# Patient Record
Sex: MALE | Race: BLACK OR AFRICAN AMERICAN | NOT HISPANIC OR LATINO | URBAN - METROPOLITAN AREA
[De-identification: names, ages, dates, MRNs, and addresses within clinical notes are randomized per-mention and may not be internally consistent; named-entity substitution may affect disease eponyms.]

---

## 2022-04-29 ENCOUNTER — INPATIENT (INPATIENT)
Facility: HOSPITAL | Age: 52
LOS: 1 days | Discharge: HOME | End: 2022-05-01
Attending: STUDENT IN AN ORGANIZED HEALTH CARE EDUCATION/TRAINING PROGRAM | Admitting: STUDENT IN AN ORGANIZED HEALTH CARE EDUCATION/TRAINING PROGRAM
Payer: SELF-PAY

## 2022-04-29 VITALS
HEART RATE: 18 BPM | HEIGHT: 68 IN | RESPIRATION RATE: 18 BRPM | TEMPERATURE: 98 F | SYSTOLIC BLOOD PRESSURE: 147 MMHG | OXYGEN SATURATION: 97 % | WEIGHT: 270.07 LBS | DIASTOLIC BLOOD PRESSURE: 95 MMHG

## 2022-04-29 DIAGNOSIS — N13.2 HYDRONEPHROSIS WITH RENAL AND URETERAL CALCULOUS OBSTRUCTION: ICD-10-CM

## 2022-04-29 DIAGNOSIS — E66.01 MORBID (SEVERE) OBESITY DUE TO EXCESS CALORIES: ICD-10-CM

## 2022-04-29 DIAGNOSIS — N28.89 OTHER SPECIFIED DISORDERS OF KIDNEY AND URETER: ICD-10-CM

## 2022-04-29 DIAGNOSIS — N17.9 ACUTE KIDNEY FAILURE, UNSPECIFIED: ICD-10-CM

## 2022-04-29 DIAGNOSIS — I10 ESSENTIAL (PRIMARY) HYPERTENSION: ICD-10-CM

## 2022-04-29 LAB
ALBUMIN SERPL ELPH-MCNC: 3.4 G/DL — SIGNIFICANT CHANGE UP (ref 3.3–5)
ALP SERPL-CCNC: 93 U/L — SIGNIFICANT CHANGE UP (ref 40–120)
ALT FLD-CCNC: 23 U/L — SIGNIFICANT CHANGE UP (ref 12–78)
ANION GAP SERPL CALC-SCNC: 4 MMOL/L — LOW (ref 5–17)
APPEARANCE UR: CLEAR — SIGNIFICANT CHANGE UP
AST SERPL-CCNC: 13 U/L — LOW (ref 15–37)
BACTERIA # UR AUTO: ABNORMAL
BASOPHILS # BLD AUTO: 0.02 K/UL — SIGNIFICANT CHANGE UP (ref 0–0.2)
BASOPHILS NFR BLD AUTO: 0.2 % — SIGNIFICANT CHANGE UP (ref 0–2)
BILIRUB SERPL-MCNC: 1.3 MG/DL — HIGH (ref 0.2–1.2)
BILIRUB UR-MCNC: NEGATIVE — SIGNIFICANT CHANGE UP
BUN SERPL-MCNC: 18 MG/DL — SIGNIFICANT CHANGE UP (ref 7–23)
CALCIUM SERPL-MCNC: 9.2 MG/DL — SIGNIFICANT CHANGE UP (ref 8.5–10.1)
CHLORIDE SERPL-SCNC: 101 MMOL/L — SIGNIFICANT CHANGE UP (ref 96–108)
CO2 SERPL-SCNC: 32 MMOL/L — HIGH (ref 22–31)
COLOR SPEC: YELLOW — SIGNIFICANT CHANGE UP
CREAT SERPL-MCNC: 1.72 MG/DL — HIGH (ref 0.5–1.3)
DIFF PNL FLD: ABNORMAL
EGFR: 47 ML/MIN/1.73M2 — LOW
EOSINOPHIL # BLD AUTO: 0.14 K/UL — SIGNIFICANT CHANGE UP (ref 0–0.5)
EOSINOPHIL NFR BLD AUTO: 1.3 % — SIGNIFICANT CHANGE UP (ref 0–6)
FLUAV AG NPH QL: SIGNIFICANT CHANGE UP
FLUBV AG NPH QL: SIGNIFICANT CHANGE UP
GLUCOSE SERPL-MCNC: 93 MG/DL — SIGNIFICANT CHANGE UP (ref 70–99)
GLUCOSE UR QL: NEGATIVE MG/DL — SIGNIFICANT CHANGE UP
HCT VFR BLD CALC: 38.7 % — LOW (ref 39–50)
HGB BLD-MCNC: 12.9 G/DL — LOW (ref 13–17)
IMM GRANULOCYTES NFR BLD AUTO: 0.5 % — SIGNIFICANT CHANGE UP (ref 0–1.5)
KETONES UR-MCNC: NEGATIVE — SIGNIFICANT CHANGE UP
LEUKOCYTE ESTERASE UR-ACNC: NEGATIVE — SIGNIFICANT CHANGE UP
LYMPHOCYTES # BLD AUTO: 1.3 K/UL — SIGNIFICANT CHANGE UP (ref 1–3.3)
LYMPHOCYTES # BLD AUTO: 12.5 % — LOW (ref 13–44)
MCHC RBC-ENTMCNC: 25.5 PG — LOW (ref 27–34)
MCHC RBC-ENTMCNC: 33.3 G/DL — SIGNIFICANT CHANGE UP (ref 32–36)
MCV RBC AUTO: 76.6 FL — LOW (ref 80–100)
MONOCYTES # BLD AUTO: 1.16 K/UL — HIGH (ref 0–0.9)
MONOCYTES NFR BLD AUTO: 11.2 % — SIGNIFICANT CHANGE UP (ref 2–14)
NEUTROPHILS # BLD AUTO: 7.71 K/UL — HIGH (ref 1.8–7.4)
NEUTROPHILS NFR BLD AUTO: 74.3 % — SIGNIFICANT CHANGE UP (ref 43–77)
NITRITE UR-MCNC: NEGATIVE — SIGNIFICANT CHANGE UP
NRBC # BLD: 0 /100 WBCS — SIGNIFICANT CHANGE UP (ref 0–0)
PH UR: 7 — SIGNIFICANT CHANGE UP (ref 5–8)
PLATELET # BLD AUTO: 228 K/UL — SIGNIFICANT CHANGE UP (ref 150–400)
POTASSIUM SERPL-MCNC: 3.7 MMOL/L — SIGNIFICANT CHANGE UP (ref 3.5–5.3)
POTASSIUM SERPL-SCNC: 3.7 MMOL/L — SIGNIFICANT CHANGE UP (ref 3.5–5.3)
PROT SERPL-MCNC: 7.9 GM/DL — SIGNIFICANT CHANGE UP (ref 6–8.3)
PROT UR-MCNC: 30 MG/DL
RBC # BLD: 5.05 M/UL — SIGNIFICANT CHANGE UP (ref 4.2–5.8)
RBC # FLD: 14.6 % — HIGH (ref 10.3–14.5)
RBC CASTS # UR COMP ASSIST: ABNORMAL /HPF (ref 0–4)
SARS-COV-2 RNA SPEC QL NAA+PROBE: SIGNIFICANT CHANGE UP
SODIUM SERPL-SCNC: 137 MMOL/L — SIGNIFICANT CHANGE UP (ref 135–145)
SP GR SPEC: 1.01 — SIGNIFICANT CHANGE UP (ref 1.01–1.02)
UROBILINOGEN FLD QL: NEGATIVE MG/DL — SIGNIFICANT CHANGE UP
WBC # BLD: 10.38 K/UL — SIGNIFICANT CHANGE UP (ref 3.8–10.5)
WBC # FLD AUTO: 10.38 K/UL — SIGNIFICANT CHANGE UP (ref 3.8–10.5)
WBC UR QL: SIGNIFICANT CHANGE UP

## 2022-04-29 PROCEDURE — 99285 EMERGENCY DEPT VISIT HI MDM: CPT

## 2022-04-29 PROCEDURE — 93010 ELECTROCARDIOGRAM REPORT: CPT

## 2022-04-29 PROCEDURE — 74177 CT ABD & PELVIS W/CONTRAST: CPT | Mod: 26,MA

## 2022-04-29 PROCEDURE — 71045 X-RAY EXAM CHEST 1 VIEW: CPT | Mod: 26

## 2022-04-29 PROCEDURE — 99232 SBSQ HOSP IP/OBS MODERATE 35: CPT

## 2022-04-29 RX ORDER — LABETALOL HCL 100 MG
300 TABLET ORAL
Refills: 0 | Status: DISCONTINUED | OUTPATIENT
Start: 2022-04-29 | End: 2022-04-30

## 2022-04-29 RX ORDER — SODIUM CHLORIDE 9 MG/ML
1000 INJECTION INTRAMUSCULAR; INTRAVENOUS; SUBCUTANEOUS ONCE
Refills: 0 | Status: COMPLETED | OUTPATIENT
Start: 2022-04-29 | End: 2022-04-29

## 2022-04-29 RX ORDER — SODIUM CHLORIDE 9 MG/ML
1000 INJECTION INTRAMUSCULAR; INTRAVENOUS; SUBCUTANEOUS
Refills: 0 | Status: DISCONTINUED | OUTPATIENT
Start: 2022-04-29 | End: 2022-04-30

## 2022-04-29 RX ORDER — LOSARTAN POTASSIUM 100 MG/1
100 TABLET, FILM COATED ORAL DAILY
Refills: 0 | Status: DISCONTINUED | OUTPATIENT
Start: 2022-04-29 | End: 2022-04-30

## 2022-04-29 RX ORDER — SODIUM CHLORIDE 9 MG/ML
1000 INJECTION, SOLUTION INTRAVENOUS
Refills: 0 | Status: DISCONTINUED | OUTPATIENT
Start: 2022-04-29 | End: 2022-04-29

## 2022-04-29 RX ORDER — CEFTRIAXONE 500 MG/1
1000 INJECTION, POWDER, FOR SOLUTION INTRAMUSCULAR; INTRAVENOUS ONCE
Refills: 0 | Status: COMPLETED | OUTPATIENT
Start: 2022-04-29 | End: 2022-04-29

## 2022-04-29 RX ORDER — CEFTRIAXONE 500 MG/1
INJECTION, POWDER, FOR SOLUTION INTRAMUSCULAR; INTRAVENOUS
Refills: 0 | Status: DISCONTINUED | OUTPATIENT
Start: 2022-04-29 | End: 2022-04-30

## 2022-04-29 RX ORDER — MORPHINE SULFATE 50 MG/1
4 CAPSULE, EXTENDED RELEASE ORAL ONCE
Refills: 0 | Status: DISCONTINUED | OUTPATIENT
Start: 2022-04-29 | End: 2022-04-29

## 2022-04-29 RX ORDER — CEFTRIAXONE 500 MG/1
1000 INJECTION, POWDER, FOR SOLUTION INTRAMUSCULAR; INTRAVENOUS EVERY 24 HOURS
Refills: 0 | Status: DISCONTINUED | OUTPATIENT
Start: 2022-04-30 | End: 2022-04-30

## 2022-04-29 RX ORDER — HYDROMORPHONE HYDROCHLORIDE 2 MG/ML
1 INJECTION INTRAMUSCULAR; INTRAVENOUS; SUBCUTANEOUS EVERY 4 HOURS
Refills: 0 | Status: DISCONTINUED | OUTPATIENT
Start: 2022-04-29 | End: 2022-04-30

## 2022-04-29 RX ORDER — AMLODIPINE BESYLATE 2.5 MG/1
10 TABLET ORAL DAILY
Refills: 0 | Status: DISCONTINUED | OUTPATIENT
Start: 2022-04-29 | End: 2022-04-30

## 2022-04-29 RX ORDER — KETOROLAC TROMETHAMINE 30 MG/ML
15 SYRINGE (ML) INJECTION ONCE
Refills: 0 | Status: DISCONTINUED | OUTPATIENT
Start: 2022-04-29 | End: 2022-04-29

## 2022-04-29 RX ORDER — FLUTICASONE PROPIONATE 50 MCG
1 SPRAY, SUSPENSION NASAL
Refills: 0 | Status: DISCONTINUED | OUTPATIENT
Start: 2022-04-29 | End: 2022-04-30

## 2022-04-29 RX ORDER — LANOLIN ALCOHOL/MO/W.PET/CERES
3 CREAM (GRAM) TOPICAL AT BEDTIME
Refills: 0 | Status: DISCONTINUED | OUTPATIENT
Start: 2022-04-29 | End: 2022-04-30

## 2022-04-29 RX ADMIN — SODIUM CHLORIDE 1000 MILLILITER(S): 9 INJECTION INTRAMUSCULAR; INTRAVENOUS; SUBCUTANEOUS at 14:25

## 2022-04-29 RX ADMIN — Medication 1 SPRAY(S): at 21:19

## 2022-04-29 RX ADMIN — Medication 15 MILLIGRAM(S): at 18:30

## 2022-04-29 RX ADMIN — Medication 3 MILLIGRAM(S): at 21:19

## 2022-04-29 RX ADMIN — LOSARTAN POTASSIUM 100 MILLIGRAM(S): 100 TABLET, FILM COATED ORAL at 21:18

## 2022-04-29 RX ADMIN — CEFTRIAXONE 100 MILLIGRAM(S): 500 INJECTION, POWDER, FOR SOLUTION INTRAMUSCULAR; INTRAVENOUS at 20:45

## 2022-04-29 RX ADMIN — AMLODIPINE BESYLATE 10 MILLIGRAM(S): 2.5 TABLET ORAL at 21:18

## 2022-04-29 RX ADMIN — SODIUM CHLORIDE 80 MILLILITER(S): 9 INJECTION INTRAMUSCULAR; INTRAVENOUS; SUBCUTANEOUS at 19:38

## 2022-04-29 RX ADMIN — HYDROMORPHONE HYDROCHLORIDE 1 MILLIGRAM(S): 2 INJECTION INTRAMUSCULAR; INTRAVENOUS; SUBCUTANEOUS at 22:48

## 2022-04-29 RX ADMIN — Medication 300 MILLIGRAM(S): at 21:19

## 2022-04-29 RX ADMIN — Medication 15 MILLIGRAM(S): at 18:05

## 2022-04-29 RX ADMIN — HYDROMORPHONE HYDROCHLORIDE 1 MILLIGRAM(S): 2 INJECTION INTRAMUSCULAR; INTRAVENOUS; SUBCUTANEOUS at 23:57

## 2022-04-29 NOTE — ED ADULT NURSE REASSESSMENT NOTE - NS ED NURSE REASSESS COMMENT FT1
Report given to nurse Verónica. Patient pending portable x-ray, will be done at bedside as per radiology.

## 2022-04-29 NOTE — ED ADULT TRIAGE NOTE - CHIEF COMPLAINT QUOTE
c/o r flank pain since tuesday using tylenol and ibuprofen prn with some improvement but pain worse since yesterday c/o chills since last night denies difficulty urinating or dysuria denies tan hematuria

## 2022-04-29 NOTE — H&P ADULT - NSHPLABSRESULTS_GEN_ALL_CORE
LABS:                        12.9   10.38 )-----------( 228      ( 2022 14:40 )             38.7         137  |  101  |  18  ----------------------------<  93  3.7   |  32<H>  |  1.72<H>    Ca    9.2      2022 14:40    TPro  7.9  /  Alb  3.4  /  TBili  1.3<H>  /  DBili  x   /  AST  13<L>  /  ALT  23  /  AlkPhos  93            Urinalysis Basic - ( 2022 16:02 )    Color: Yellow / Appearance: Clear / S.010 / pH: x  Gluc: x / Ketone: Negative  / Bili: Negative / Urobili: Negative mg/dL   Blood: x / Protein: 30 mg/dL / Nitrite: Negative   Leuk Esterase: Negative / RBC: 11-25 /HPF / WBC 0-2   Sq Epi: x / Non Sq Epi: x / Bacteria: Moderate

## 2022-04-29 NOTE — ED ADULT NURSE NOTE - HOW OFTEN DO YOU HAVE SIX OR MORE DRINKS ON ONE OCCASION?
Never Acitretin Counseling:  I discussed with the patient the risks of acitretin including but not limited to hair loss, dry lips/skin/eyes, liver damage, hyperlipidemia, depression/suicidal ideation, photosensitivity.  Serious rare side effects can include but are not limited to pancreatitis, pseudotumor cerebri, bony changes, clot formation/stroke/heart attack.  Patient understands that alcohol is contraindicated since it can result in liver toxicity and significantly prolong the elimination of the drug by many years.

## 2022-04-29 NOTE — ED PROVIDER NOTE - OBJECTIVE STATEMENT
51 years old male walked in c/o two days of abd pain and right flank pain without n/v/d. Pt last bowel movement this morning, Pt just had Pfizer boot shot this wk. Pt sts the pain varies with body movement. Pt sts he has no pain now because he took tylenol and motrin at 10:00 am this morning. Pt denies headache, dizziness, blurred visions, light sensitivities, focal/distal weakness or numbness, cough, sob, chest pain, nausea vomiting, fever, chills, abd pain, dysuria, hematuria or irregular bowel movements

## 2022-04-29 NOTE — CONSULT NOTE ADULT - ASSESSMENT
Impression: Urology consulted for proximal right ureteral stone approx 1cm, right renal mass approx    Recommendations:    NPO after midnight  IVF at midnight  Plan for OR tomorrow afternoon for cystoscopy, right ureteral stent insertion.

## 2022-04-29 NOTE — ED PROVIDER NOTE - CONSTITUTIONAL, MLM
normal... Well appearing, awake, alert, oriented to person, place, time/situation and in no apparent distress. Speaking in clear full sentences no nasal flaring no shoulders retractions no diaphoresis appears very comfortable

## 2022-04-29 NOTE — ED ADULT NURSE NOTE - OBJECTIVE STATEMENT
Patient alert and oriented X 3, c/o right flank pain X 4 days, state pain has worsened. States he took Tylenol and Motrin before coming to ED and pain subsided.  denies chills, fevers, N/V/D. denies dysuria or changes in color or odor of urine.

## 2022-04-29 NOTE — H&P ADULT - NSHPPHYSICALEXAM_GEN_ALL_CORE
PHYSICAL EXAMINATION:  Vital Signs Last 24 Hrs  T(C): 36.6 (29 Apr 2022 13:05), Max: 36.6 (29 Apr 2022 13:05)  T(F): 97.8 (29 Apr 2022 13:05), Max: 97.8 (29 Apr 2022 13:05)  HR: 18 (29 Apr 2022 13:05) (18 - 18)  BP: 147/95 (29 Apr 2022 13:05) (147/95 - 147/95)  BP(mean): --  RR: 18 (29 Apr 2022 13:05) (18 - 18)  SpO2: 97% (29 Apr 2022 13:05) (97% - 97%)  CAPILLARY BLOOD GLUCOSE            GENERAL: NAD, well-groomed,  HEAD:  atraumatic, normocephalic  EYES: sclera anicteric  ENMT: mucous membranes moist  NECK: supple, No JVD  CHEST/LUNG: clear to auscultation bilaterally;    no      rales   ,   no rhonchi,   HEART: normal S1, S2  ABDOMEN: BS+, soft, ND, NT   no flank tenderness  now  EXTREMITIES:    no    edema    b/l LEs  NEURO: awake, ,     moves all extremities  SKIN: no     rash

## 2022-04-29 NOTE — H&P ADULT - ASSESSMENT
51 years old male    h/p  HTN       c/o two days of abd pain and right flank pain   had Pfizer boot shot this wk.   he took tylenol and motrin at 10:00 am this morning., and pain was  bit better    denies headache, dizziness, cough, sob, chest pain, nausea vomiting, fever, chills, abd pain, dysuria       *  admitted with ab d/  right flank pain for   4  to 5  days   pt  with right renal   stone, 1  cm/  renal mass  CT a/p, 1cn prox Right ureteral calculus,  12 cm   by 12  cm  right renal mass  on iv fluids   iv dilaudid  strain urine  pt is afebrile now  *  probable RCC  await urology  eval  Urology called by er  *  HTN   on  hyzaar, norvasc.  labetolol  *  on dvt ppx   check cxr/ ekg     RAD< from: CT Abdomen and Pelvis w/ IV Cont (04.29.22 @ 15:42) >  IMPRESSION:  1 cm right proximal ureteral calculus with mild hydronephrosis and   obstructive uropathy.  12.8 x 11.9 x 12.1 cm right lower pole renal mass suspicious for   neoplasm. Complex cyst and abscess are not excluded however neoplasm is   favored. Recommend follow-up CT or MRI with renal mass protocol and/or   ultrasound for additional characterization. Limited evaluation of the   right renal vein and IVC secondary to phase of injection.  Findings were discussed with Dr. STEFANY ZULUAGA [id number] 4/29/2022 4:03 PM by   Dr. Ro Akhtar with read back confirmation.  < end of copied text >   51 years old male    h/p  HTN       c/o two days of abd pain and right flank pain   had Pfizer boot shot this wk.   he took tylenol and motrin at 10:00 am this morning., and pain was  bit better    denies headache, dizziness, cough, sob, chest pain, nausea vomiting, fever, chills, abd pain, dysuria       *  admitted with abd/ right flank pain for   4  to 5  days   pt  with right renal   stone, 1  cm/  renal mass  CT a/p, 1cn prox Right ureteral calculus,  12 cm   by 12  cm  right renal mass  on iv fluids   iv Dilaudid  strain urine  pt is afebrile now  *  probable RCC  await urology  eval  Urology called by er  *  ADELINA   on iv fluids  *  HTN   on  hyzaar, norvasc.  labetolol  *  on dvt ppx   check cxr/ ekg     RAD< from: CT Abdomen and Pelvis w/ IV Cont (04.29.22 @ 15:42) >  IMPRESSION:  1 cm right proximal ureteral calculus with mild hydronephrosis and   obstructive uropathy.  12.8 x 11.9 x 12.1 cm right lower pole renal mass suspicious for   neoplasm. Complex cyst and abscess are not excluded however neoplasm is   favored. Recommend follow-up CT or MRI with renal mass protocol and/or   ultrasound for additional characterization. Limited evaluation of the   right renal vein and IVC secondary to phase of injection.  Findings were discussed with Dr. STEFANY ZULUAAG [id number] 4/29/2022 4:03 PM by   Dr. Ro Akhtar with read back confirmation.  < end of copied text >   51 years old male    h/p  HTN       c/o two days of abd pain and right flank pain   had Pfizer boot shot this wk.   he took tylenol and motrin at 10:00 am this morning., and pain was  bit better    denies headache, dizziness, cough, sob, chest pain, nausea vomiting, fever, chills, abd pain, dysuria       *  admitted with abd/ right flank pain for   4  to 5  days   pt  with right renal   stone, 1  cm/  renal mass  CT a/p, 1cn prox Right ureteral calculus,  12 cm   by 12  cm  right renal mass  on iv fluids   iv Dilaudid  strain urine  pt is afebrile now  *  probable RCC  await urology  eval  Urology called by er  *  ADELINA   on iv fluids  *  HTN   on  hyzaar, norvasc.  labetolol  *  on dvt ppx / PAS/  awaiting  OR in am  check cxr/ ekg     RAD< from: CT Abdomen and Pelvis w/ IV Cont (04.29.22 @ 15:42) >  IMPRESSION:  1 cm right proximal ureteral calculus with mild hydronephrosis and   obstructive uropathy.  12.8 x 11.9 x 12.1 cm right lower pole renal mass suspicious for   neoplasm. Complex cyst and abscess are not excluded however neoplasm is   favored. Recommend follow-up CT or MRI with renal mass protocol and/or   ultrasound for additional characterization. Limited evaluation of the   right renal vein and IVC secondary to phase of injection.  Findings were discussed with Dr. STEFANY ZULUAGA [id number] 4/29/2022 4:03 PM by   Dr. Ro Akhtar with read back confirmation.  < end of copied text >   51 years old male    h/p  HTN       c/o two days of abd pain and right flank pain   had Pfizer boot shot this wk.   he took tylenol and motrin at 10:00 am this morning., and pain was  bit better    denies headache, dizziness, cough, sob, chest pain, nausea vomiting, fever, chills, abd pain, dysuria       *  admitted with abd/ right flank pain for   4  to 5  days   pt  with right renal   stone, 1  cm/  renal mass  CT a/p, 1cn prox Right ureteral calculus,  12 cm   by 12  cm  right renal mass  on iv fluids   iv Dilaudid  strain urine  pt is afebrile now  *  probable RCC  await urology  eval  Urology called by er  *  ADELINA   on iv fluids.  and hctz   was held  *  HTN   on  hyzaar, norvasc.  labetolol  *  on dvt ppx / PAS/  awaiting  OR in am  check cxr/ ekg     RAD< from: CT Abdomen and Pelvis w/ IV Cont (04.29.22 @ 15:42) >  IMPRESSION:  1 cm right proximal ureteral calculus with mild hydronephrosis and   obstructive uropathy.  12.8 x 11.9 x 12.1 cm right lower pole renal mass suspicious for   neoplasm. Complex cyst and abscess are not excluded however neoplasm is   favored. Recommend follow-up CT or MRI with renal mass protocol and/or   ultrasound for additional characterization. Limited evaluation of the   right renal vein and IVC secondary to phase of injection.  Findings were discussed with Dr. STEFANY ZULUAGA [phone] 4/29/2022 4:03 PM by   Dr. Ro Akhtar with read back confirmation.  < end of copied text >

## 2022-04-29 NOTE — PATIENT PROFILE ADULT - FALL HARM RISK - RISK INTERVENTIONS
Bed in lowest position, wheels locked, appropriate side rails in place/Call bell, personal items and telephone in reach/[place] to call system/Purposeful Proactive Rounding/Room/bathroom lighting operational, light cord in reach

## 2022-04-29 NOTE — H&P ADULT - HISTORY OF PRESENT ILLNESS
51 years old male    h/p  HTN       c/o two days of abd pain and right flank pain   denies   n/v/d.   last bowel movement this morning,   had Pfizer boot shot this wk.   he took tylenol and motrin at 10:00 am this morning.        denies headache, dizziness, cough, sob, chest pain, nausea vomiting, fever, chills, abd pain, dysuria  urology  PA called by er

## 2022-04-29 NOTE — CONSULT NOTE ADULT - SUBJECTIVE AND OBJECTIVE BOX
UROLOGY CONSULT NOTE    Patient is a 51y old  Male who presents with a chief complaint of abd pain (2022 18:00)      HPI:  "51 years old male    h/p  HTN       c/o two days of abd pain and right flank pain   denies   n/v/d.   last bowel movement this morning,   had Pfizer boot shot this wk.   he took tylenol and motrin at 10:00 am this morning.        denies headache, dizziness, cough, sob, chest pain, nausea vomiting, fever, chills, abd pain, dysuria  urology  PA called by er "    Urology consulted for proximal right ureteral stone approx 1cm. Cr bumped to 1.7. Patient states right back/flank pain started this AM, attempted to take tylenol and motrin with no relief of pain. Last ate approx 1pm. States that pain is well controlled. Denies nausea and vomiting. Denies hx of kidney stones. Denies gross hematuria. Discussed right renal mass, patient unaware it is there, will need followup imaging.       PAST MEDICAL & SURGICAL HISTORY:  No pertinent past medical history  Hypertension    REVIEW OF SYSTEMS:  Constitutional: Denies fever, weight loss, fatigue  Eye: Denies eye pain, visual changes, discharge, blurred vision  ENT: Denies hearing changes, tinnitus, vertigo, sinus congestion, sore throat  Neck: Denies pain or stiffness  Respiratory: Denies cough, wheezing, chills, hemoptysis, shortness of breath, difficulty breathing  Cardiovascular: Denies chest pain, palpitations, dizziness, leg swelling  Gastrointestinal: Denies abdominal pain, nausea, vomiting, hematemesis, diarrhea, constipation, melena, hematochezia  Genitourinary: Denies dysuria, frequency, hematuria, retention, incontinence, Endorses CVA tenderness  Neurological: Denies headaches, memory loss, loss of strength, numbness, tremors  Skin: Denies itching, burning, rashes, lesions   Endocrine: Denies heat or cold intolerance, hair loss  Musculoskeletal: Denies joint pain or swelling, back, extremity pain  Psychiatric: Denies depression, anxiety, mood swings, difficulty sleeping, suicidal ideation  Hematology: Denies easy bruising, bleeding gums  Immunologic: Denies hives or eczema    MEDICATIONS  (STANDING):  amLODIPine   Tablet 10 milliGRAM(s) Oral daily  labetalol 300 milliGRAM(s) Oral two times a day  losartan 100 milliGRAM(s) Oral daily  melatonin 3 milliGRAM(s) Oral at bedtime  morphine  - Injectable 4 milliGRAM(s) IV Push once  sodium chloride 0.9%. 1000 milliLiter(s) (80 mL/Hr) IV Continuous <Continuous>    MEDICATIONS  (PRN):  HYDROmorphone  Injectable 1 milliGRAM(s) IV Push every 4 hours PRN Moderate Pain (4 - 6)      Allergies  No Known Allergies          SOCIAL HISTORY          Smoking: Yes [ ]  No [x ]   ______pk yrs          ETOH  Yes [ ]  No [x ]  Social [ ]          DRUGS:  Yes [ ]  No [x ]  if so what______________    FAMILY HISTORY:      Vital Signs Last 24 Hrs  T(C): 37.9 (2022 18:53), Max: 37.9 (2022 18:53)  T(F): 100.3 (2022 18:53), Max: 100.3 (2022 18:53)  HR: 96 (2022 18:53) (18 - 96)  BP: 144/95 (2022 18:53) (144/95 - 147/95)  BP(mean): --  RR: 18 (2022 18:53) (18 - 18)  SpO2: 95% (2022 18:53) (95% - 97%)    Physical Exam:    GENERAL: NAD, well-groomed, thin  HEAD:  Atraumatic, Normocephalic  EYES: EOMI, PERRLA, conjunctiva and sclera clear  NECK: Supple, No JVD, Normal thyroid  NERVOUS SYSTEM:  Alert & Oriented X3, Good concentration  CHEST/LUNG: Clear to percussion bilaterally  HEART: Regular rate and rhythm  ABDOMEN: Soft, mildly diffuse tenderness, Nondistended  EXTREMITIES:  2+ Peripheral Pulses  LYMPH: No cervical adenopathy  : No suprapubic tenderness, no bladder distention, no gross hematuria.  SKIN: No rashes or lesions      LABS:                        12.9   10.38 )-----------( 228      ( 2022 14:40 )             38.7         137  |  101  |  18  ----------------------------<  93  3.7   |  32<H>  |  1.72<H>    Ca    9.2      2022 14:40    TPro  7.9  /  Alb  3.4  /  TBili  1.3<H>  /  DBili  x   /  AST  13<L>  /  ALT  23  /  AlkPhos  93        Urinalysis Basic - ( 2022 16:02 )    Color: Yellow / Appearance: Clear / S.010 / pH: x  Gluc: x / Ketone: Negative  / Bili: Negative / Urobili: Negative mg/dL   Blood: x / Protein: 30 mg/dL / Nitrite: Negative   Leuk Esterase: Negative / RBC: 11-25 /HPF / WBC 0-2   Sq Epi: x / Non Sq Epi: x / Bacteria: Moderate        RADIOLOGY & ADDITIONAL STUDIES:  ACC: [id number] EXAM:  CT ABDOMEN AND PELVIS IC                          PROCEDURE DATE:  2022          INTERPRETATION:  CLINICAL INFORMATION: 51 years  Male with right flank   pain x 2 days.    COMPARISON: None.    CONTRAST/COMPLICATIONS:  IV Contrast: Omnipaque 350  90 cc administered   10 cc discarded  Oral Contrast: NONE  Complications: None reported at time of study completion    PROCEDURE:  CT of the Abdomen and Pelvis was performed.  Sagittal and coronal reformats were performed.    FINDINGS:  LOWER CHEST: Mild right middle lobe discoid atelectasis. Mild   cardiomegaly.    LIVER: Within normal limits.  BILE DUCTS: Normal caliber.  GALLBLADDER: Within normal limits.  SPLEEN: Within normal limits.  PANCREAS: Within normal limits.  ADRENALS: Within normal limits.  KIDNEYS/URETERS: Mild right hydronephrosis to the level of a 1 cm   proximal ureteral calculus with delayed right nephrogram secondary to   obstruction. There is also a 12.8 x 11.9 x 12.1 cm heterogeneous mass   projecting off the right lower pole suspicious for neoplasm. The renal   vein and IVC cannot be assessed due to phase of injection.  No left hydronephrosis. Left renal hypodensities too small to   characterize. No left urolithiasis.    BLADDER: Within normal limits.  REPRODUCTIVE ORGANS: Normal size prostate with punctate right-sided   calcification.    BOWEL: No bowel obstruction. Appendix is not visualized. No evidence of   inflammation in the pericecal region.  PERITONEUM: No ascites.  VESSELS: Limited evaluation of the right renal vein and IVC secondary to   phase of injection. Mild atherosclerosis.  RETROPERITONEUM/LYMPH NODES: No lymphadenopathy.  ABDOMINAL WALL: Small fat-containing umbilical hernia.  BONES: Degenerative changes.    IMPRESSION:  1 cm right proximal ureteral calculus with mild hydronephrosis and   obstructive uropathy.    12.8 x 11.9 x 12.1 cm right lower pole renal mass suspicious for   neoplasm. Complex cyst and abscess are not excluded however neoplasm is   favored. Recommend follow-up CT or MRI with renal mass protocol and/or   ultrasound for additional characterization. Limited evaluation of the   right renal vein and IVC secondary to phase of injection.    Findings were discussed with Dr. STEFANY ZULUAGA [phone] 2022 4:03 PM by   Dr. Ro Akhtar with read back confirmation.    --- End of Report ---

## 2022-04-30 LAB
ANION GAP SERPL CALC-SCNC: 6 MMOL/L — SIGNIFICANT CHANGE UP (ref 5–17)
APTT BLD: 30.5 SEC — SIGNIFICANT CHANGE UP (ref 27.5–35.5)
BUN SERPL-MCNC: 19 MG/DL — SIGNIFICANT CHANGE UP (ref 7–23)
CALCIUM SERPL-MCNC: 9 MG/DL — SIGNIFICANT CHANGE UP (ref 8.5–10.1)
CHLORIDE SERPL-SCNC: 105 MMOL/L — SIGNIFICANT CHANGE UP (ref 96–108)
CO2 SERPL-SCNC: 28 MMOL/L — SIGNIFICANT CHANGE UP (ref 22–31)
CREAT SERPL-MCNC: 1.84 MG/DL — HIGH (ref 0.5–1.3)
EGFR: 43 ML/MIN/1.73M2 — LOW
GLUCOSE SERPL-MCNC: 119 MG/DL — HIGH (ref 70–99)
HCT VFR BLD CALC: 39.6 % — SIGNIFICANT CHANGE UP (ref 39–50)
HGB BLD-MCNC: 12.7 G/DL — LOW (ref 13–17)
INR BLD: 1.12 RATIO — SIGNIFICANT CHANGE UP (ref 0.88–1.16)
MCHC RBC-ENTMCNC: 24.9 PG — LOW (ref 27–34)
MCHC RBC-ENTMCNC: 32.1 G/DL — SIGNIFICANT CHANGE UP (ref 32–36)
MCV RBC AUTO: 77.5 FL — LOW (ref 80–100)
NRBC # BLD: 0 /100 WBCS — SIGNIFICANT CHANGE UP (ref 0–0)
PLATELET # BLD AUTO: 243 K/UL — SIGNIFICANT CHANGE UP (ref 150–400)
POTASSIUM SERPL-MCNC: 4.1 MMOL/L — SIGNIFICANT CHANGE UP (ref 3.5–5.3)
POTASSIUM SERPL-SCNC: 4.1 MMOL/L — SIGNIFICANT CHANGE UP (ref 3.5–5.3)
PROTHROM AB SERPL-ACNC: 13.4 SEC — SIGNIFICANT CHANGE UP (ref 10.5–13.4)
RBC # BLD: 5.11 M/UL — SIGNIFICANT CHANGE UP (ref 4.2–5.8)
RBC # FLD: 14.6 % — HIGH (ref 10.3–14.5)
SODIUM SERPL-SCNC: 139 MMOL/L — SIGNIFICANT CHANGE UP (ref 135–145)
WBC # BLD: 9.1 K/UL — SIGNIFICANT CHANGE UP (ref 3.8–10.5)
WBC # FLD AUTO: 9.1 K/UL — SIGNIFICANT CHANGE UP (ref 3.8–10.5)

## 2022-04-30 PROCEDURE — 88112 CYTOPATH CELL ENHANCE TECH: CPT | Mod: 26

## 2022-04-30 PROCEDURE — 99233 SBSQ HOSP IP/OBS HIGH 50: CPT

## 2022-04-30 DEVICE — GWIRE STR .035X150 STD: Type: IMPLANTABLE DEVICE | Status: FUNCTIONAL

## 2022-04-30 DEVICE — STENT URET PERCFLX PLUS 6X26 W/O GDWIRE: Type: IMPLANTABLE DEVICE | Status: FUNCTIONAL

## 2022-04-30 DEVICE — CATH URET POLLACK 5.5FR 70CM: Type: IMPLANTABLE DEVICE | Status: FUNCTIONAL

## 2022-04-30 DEVICE — GWIRE SENSOR DUAL-FLEX NITINOL0.038INX150CM: Type: IMPLANTABLE DEVICE | Status: FUNCTIONAL

## 2022-04-30 RX ORDER — SODIUM CHLORIDE 9 MG/ML
1000 INJECTION, SOLUTION INTRAVENOUS
Refills: 0 | Status: DISCONTINUED | OUTPATIENT
Start: 2022-04-30 | End: 2022-05-01

## 2022-04-30 RX ORDER — SODIUM CHLORIDE 9 MG/ML
1000 INJECTION, SOLUTION INTRAVENOUS
Refills: 0 | Status: DISCONTINUED | OUTPATIENT
Start: 2022-04-30 | End: 2022-04-30

## 2022-04-30 RX ORDER — TRAMADOL HYDROCHLORIDE 50 MG/1
50 TABLET ORAL EVERY 6 HOURS
Refills: 0 | Status: DISCONTINUED | OUTPATIENT
Start: 2022-04-30 | End: 2022-05-01

## 2022-04-30 RX ORDER — HYDROMORPHONE HYDROCHLORIDE 2 MG/ML
0.5 INJECTION INTRAMUSCULAR; INTRAVENOUS; SUBCUTANEOUS
Refills: 0 | Status: DISCONTINUED | OUTPATIENT
Start: 2022-04-30 | End: 2022-04-30

## 2022-04-30 RX ORDER — HYDROMORPHONE HYDROCHLORIDE 2 MG/ML
1 INJECTION INTRAMUSCULAR; INTRAVENOUS; SUBCUTANEOUS
Refills: 0 | Status: DISCONTINUED | OUTPATIENT
Start: 2022-04-30 | End: 2022-04-30

## 2022-04-30 RX ORDER — METOCLOPRAMIDE HCL 10 MG
10 TABLET ORAL ONCE
Refills: 0 | Status: DISCONTINUED | OUTPATIENT
Start: 2022-04-30 | End: 2022-04-30

## 2022-04-30 RX ADMIN — Medication 300 MILLIGRAM(S): at 05:52

## 2022-04-30 RX ADMIN — LOSARTAN POTASSIUM 100 MILLIGRAM(S): 100 TABLET, FILM COATED ORAL at 05:52

## 2022-04-30 RX ADMIN — HYDROMORPHONE HYDROCHLORIDE 1 MILLIGRAM(S): 2 INJECTION INTRAMUSCULAR; INTRAVENOUS; SUBCUTANEOUS at 09:07

## 2022-04-30 RX ADMIN — AMLODIPINE BESYLATE 10 MILLIGRAM(S): 2.5 TABLET ORAL at 05:53

## 2022-04-30 RX ADMIN — HYDROMORPHONE HYDROCHLORIDE 1 MILLIGRAM(S): 2 INJECTION INTRAMUSCULAR; INTRAVENOUS; SUBCUTANEOUS at 09:30

## 2022-04-30 RX ADMIN — SODIUM CHLORIDE 125 MILLILITER(S): 9 INJECTION, SOLUTION INTRAVENOUS at 15:29

## 2022-04-30 RX ADMIN — HYDROMORPHONE HYDROCHLORIDE 1 MILLIGRAM(S): 2 INJECTION INTRAMUSCULAR; INTRAVENOUS; SUBCUTANEOUS at 04:47

## 2022-04-30 RX ADMIN — HYDROMORPHONE HYDROCHLORIDE 1 MILLIGRAM(S): 2 INJECTION INTRAMUSCULAR; INTRAVENOUS; SUBCUTANEOUS at 05:11

## 2022-04-30 RX ADMIN — Medication 1 SPRAY(S): at 05:52

## 2022-04-30 RX ADMIN — SODIUM CHLORIDE 80 MILLILITER(S): 9 INJECTION INTRAMUSCULAR; INTRAVENOUS; SUBCUTANEOUS at 04:47

## 2022-05-01 VITALS
SYSTOLIC BLOOD PRESSURE: 147 MMHG | DIASTOLIC BLOOD PRESSURE: 96 MMHG | TEMPERATURE: 98 F | OXYGEN SATURATION: 98 % | HEART RATE: 92 BPM | RESPIRATION RATE: 18 BRPM

## 2022-05-01 LAB
ANION GAP SERPL CALC-SCNC: 8 MMOL/L — SIGNIFICANT CHANGE UP (ref 5–17)
BASOPHILS # BLD AUTO: 0.01 K/UL — SIGNIFICANT CHANGE UP (ref 0–0.2)
BASOPHILS NFR BLD AUTO: 0.1 % — SIGNIFICANT CHANGE UP (ref 0–2)
BUN SERPL-MCNC: 20 MG/DL — SIGNIFICANT CHANGE UP (ref 7–23)
CALCIUM SERPL-MCNC: 9.6 MG/DL — SIGNIFICANT CHANGE UP (ref 8.5–10.1)
CHLORIDE SERPL-SCNC: 108 MMOL/L — SIGNIFICANT CHANGE UP (ref 96–108)
CO2 SERPL-SCNC: 26 MMOL/L — SIGNIFICANT CHANGE UP (ref 22–31)
CREAT SERPL-MCNC: 1.29 MG/DL — SIGNIFICANT CHANGE UP (ref 0.5–1.3)
CULTURE RESULTS: SIGNIFICANT CHANGE UP
EGFR: 66 ML/MIN/1.73M2 — SIGNIFICANT CHANGE UP
EOSINOPHIL # BLD AUTO: 0.01 K/UL — SIGNIFICANT CHANGE UP (ref 0–0.5)
EOSINOPHIL NFR BLD AUTO: 0.1 % — SIGNIFICANT CHANGE UP (ref 0–6)
GLUCOSE SERPL-MCNC: 115 MG/DL — HIGH (ref 70–99)
HCT VFR BLD CALC: 39.6 % — SIGNIFICANT CHANGE UP (ref 39–50)
HGB BLD-MCNC: 13.1 G/DL — SIGNIFICANT CHANGE UP (ref 13–17)
IMM GRANULOCYTES NFR BLD AUTO: 0.6 % — SIGNIFICANT CHANGE UP (ref 0–1.5)
LYMPHOCYTES # BLD AUTO: 0.94 K/UL — LOW (ref 1–3.3)
LYMPHOCYTES # BLD AUTO: 10 % — LOW (ref 13–44)
MCHC RBC-ENTMCNC: 25.2 PG — LOW (ref 27–34)
MCHC RBC-ENTMCNC: 33.1 G/DL — SIGNIFICANT CHANGE UP (ref 32–36)
MCV RBC AUTO: 76.2 FL — LOW (ref 80–100)
MONOCYTES # BLD AUTO: 0.63 K/UL — SIGNIFICANT CHANGE UP (ref 0–0.9)
MONOCYTES NFR BLD AUTO: 6.7 % — SIGNIFICANT CHANGE UP (ref 2–14)
NEUTROPHILS # BLD AUTO: 7.74 K/UL — HIGH (ref 1.8–7.4)
NEUTROPHILS NFR BLD AUTO: 82.5 % — HIGH (ref 43–77)
NRBC # BLD: 0 /100 WBCS — SIGNIFICANT CHANGE UP (ref 0–0)
PLATELET # BLD AUTO: 265 K/UL — SIGNIFICANT CHANGE UP (ref 150–400)
POTASSIUM SERPL-MCNC: 4 MMOL/L — SIGNIFICANT CHANGE UP (ref 3.5–5.3)
POTASSIUM SERPL-SCNC: 4 MMOL/L — SIGNIFICANT CHANGE UP (ref 3.5–5.3)
RBC # BLD: 5.2 M/UL — SIGNIFICANT CHANGE UP (ref 4.2–5.8)
RBC # FLD: 14.5 % — SIGNIFICANT CHANGE UP (ref 10.3–14.5)
SODIUM SERPL-SCNC: 142 MMOL/L — SIGNIFICANT CHANGE UP (ref 135–145)
SPECIMEN SOURCE: SIGNIFICANT CHANGE UP
WBC # BLD: 9.39 K/UL — SIGNIFICANT CHANGE UP (ref 3.8–10.5)
WBC # FLD AUTO: 9.39 K/UL — SIGNIFICANT CHANGE UP (ref 3.8–10.5)

## 2022-05-01 PROCEDURE — 99239 HOSP IP/OBS DSCHRG MGMT >30: CPT

## 2022-05-01 RX ORDER — LABETALOL HCL 100 MG
1 TABLET ORAL
Qty: 0 | Refills: 0 | DISCHARGE

## 2022-05-01 RX ORDER — LABETALOL HCL 100 MG
300 TABLET ORAL
Refills: 0 | Status: DISCONTINUED | OUTPATIENT
Start: 2022-05-01 | End: 2022-05-01

## 2022-05-01 RX ORDER — AMLODIPINE BESYLATE 2.5 MG/1
10 TABLET ORAL DAILY
Refills: 0 | Status: DISCONTINUED | OUTPATIENT
Start: 2022-05-01 | End: 2022-05-01

## 2022-05-01 RX ORDER — LOSARTAN POTASSIUM 100 MG/1
100 TABLET, FILM COATED ORAL DAILY
Refills: 0 | Status: DISCONTINUED | OUTPATIENT
Start: 2022-05-01 | End: 2022-05-01

## 2022-05-01 RX ORDER — LOSARTAN/HYDROCHLOROTHIAZIDE 100MG-25MG
1 TABLET ORAL
Qty: 0 | Refills: 0 | DISCHARGE

## 2022-05-01 RX ORDER — LABETALOL HCL 100 MG
1 TABLET ORAL
Qty: 60 | Refills: 0
Start: 2022-05-01 | End: 2022-05-30

## 2022-05-01 RX ORDER — AMLODIPINE BESYLATE 2.5 MG/1
1 TABLET ORAL
Qty: 0 | Refills: 0 | DISCHARGE

## 2022-05-01 RX ORDER — AMLODIPINE BESYLATE 2.5 MG/1
1 TABLET ORAL
Qty: 30 | Refills: 0
Start: 2022-05-01 | End: 2022-05-30

## 2022-05-01 RX ORDER — LOSARTAN/HYDROCHLOROTHIAZIDE 100MG-25MG
1 TABLET ORAL
Qty: 30 | Refills: 0
Start: 2022-05-01 | End: 2022-05-30

## 2022-05-01 RX ADMIN — AMLODIPINE BESYLATE 10 MILLIGRAM(S): 2.5 TABLET ORAL at 10:36

## 2022-05-01 RX ADMIN — SODIUM CHLORIDE 75 MILLILITER(S): 9 INJECTION, SOLUTION INTRAVENOUS at 11:10

## 2022-05-01 RX ADMIN — LOSARTAN POTASSIUM 100 MILLIGRAM(S): 100 TABLET, FILM COATED ORAL at 10:36

## 2022-05-01 NOTE — DISCHARGE NOTE PROVIDER - ATTENDING DISCHARGE PHYSICAL EXAMINATION:
Vital Signs Last 24 Hrs  T(F): 98.2 (01 May 2022 11:20), Max: 98.9 (30 Apr 2022 15:13)  HR: 105 (01 May 2022 11:20) (89 - 105)  BP: 153/97 (01 May 2022 11:20) (113/72 - 153/97)  RR: 18 (01 May 2022 11:20) (15 - 26)  SpO2: 97% (01 May 2022 11:20) (94% - 98%)    Physical Exam:  Constitutional: NAD, awake and alert, well-developed  Neck: Soft and supple, No LAD, No JVD  Respiratory: cta b/l no wheezing no rhonchi  Cardiovascular: +s1/s2 no edema b/l le  Gastrointestinal: soft nt nd bs+  Vascular: 2+ peripheral pulses  Neurological: A/O x 3, no focal deficits

## 2022-05-01 NOTE — PROGRESS NOTE ADULT - SUBJECTIVE AND OBJECTIVE BOX
Patient is a 51y old  Male who presents with a chief complaint of abd pain (2022 06:15)    INTERVAL HPI/OVERNIGHT EVENTS: No acute events overnight. HD stable.    MEDICATIONS  (STANDING):  amLODIPine   Tablet 10 milliGRAM(s) Oral daily  cefTRIAXone   IVPB      cefTRIAXone   IVPB 1000 milliGRAM(s) IV Intermittent every 24 hours  fluticasone propionate 50 MICROgram(s)/spray Nasal Spray 1 Spray(s) Both Nostrils two times a day  labetalol 300 milliGRAM(s) Oral two times a day  losartan 100 milliGRAM(s) Oral daily  melatonin 3 milliGRAM(s) Oral at bedtime  sodium chloride 0.9%. 1000 milliLiter(s) (80 mL/Hr) IV Continuous <Continuous>    MEDICATIONS  (PRN):  HYDROmorphone  Injectable 1 milliGRAM(s) IV Push every 4 hours PRN Moderate Pain (4 - 6)      Allergies    No Known Allergies    Intolerances        REVIEW OF SYSTEMS: all negative with exception of above    Vital Signs Last 24 Hrs  T(C): 36.7 (2022 10:49), Max: 37.9 (2022 18:53)  T(F): 98.1 (2022 10:49), Max: 100.3 (2022 18:53)  HR: 88 (2022 10:49) (18 - 98)  BP: 123/78 (2022 10:49) (117/88 - 149/91)  BP(mean): --  RR: 18 (2022 10:49) (16 - 18)  SpO2: 95% (2022 10:49) (91% - 97%)    PHYSICAL EXAM:  GENERAL: NAD, well-groomed,  HEAD:  atraumatic, normocephalic  EYES: sclera anicteric  ENMT: mucous membranes moist  NECK: supple, No JVD  CHEST/LUNG: clear to auscultation bilaterally; no rales,  no rhonchi,   HEART: normal S1, S2  ABDOMEN: BS+, soft, ND, NT   no flank tenderness  now  EXTREMITIES:    no    edema    b/l LEs  NEURO: awake, ,     moves all extremities  SKIN: no     rash    LABS:                        12.7   9.10  )-----------( 243      ( 2022 08:14 )             39.6     04-30    139  |  105  |  19  ----------------------------<  119<H>  4.1   |  28  |  1.84<H>    Ca    9.0      2022 08:14    TPro  7.9  /  Alb  3.4  /  TBili  1.3<H>  /  DBili  x   /  AST  13<L>  /  ALT  23  /  AlkPhos  93      PT/INR - ( 2022 08:14 )   PT: 13.4 sec;   INR: 1.12 ratio         PTT - ( 2022 08:14 )  PTT:30.5 sec  Urinalysis Basic - ( 2022 16:02 )    Color: Yellow / Appearance: Clear / S.010 / pH: x  Gluc: x / Ketone: Negative  / Bili: Negative / Urobili: Negative mg/dL   Blood: x / Protein: 30 mg/dL / Nitrite: Negative   Leuk Esterase: Negative / RBC: 11-25 /HPF / WBC 0-2   Sq Epi: x / Non Sq Epi: x / Bacteria: Moderate      CAPILLARY BLOOD GLUCOSE          RADIOLOGY & ADDITIONAL TESTS:    Imaging Personally Reviewed:  [ ] YES  [ ] NO    Consultant(s) Notes Reviewed:  [ ] YES  [ ] NO    Care Discussed with Consultants/Other Providers [ ] YES  [ ] NO          
SURGERY PROGRESS HPI:  Pt seen and examined at bedside. Pain is well controlled on pain medication. Pt denies complaints. No acute events overnight. Pt tolerating diet. Pt denies nausea and vomiting.  +BM/flatus. Voiding well. Pt denies chest pain, SOB, dizziness, fever, chills. Ambulating.      Vital Signs Last 24 Hrs  T(C): 36.6 (01 May 2022 04:49), Max: 37.2 (2022 15:13)  T(F): 97.8 (01 May 2022 04:49), Max: 98.9 (2022 15:13)  HR: 93 (01 May 2022 04:49) (88 - 103)  BP: 113/72 (01 May 2022 04:49) (113/72 - 150/91)  BP(mean): --  RR: 17 (01 May 2022 04:49) (15 - 26)  SpO2: 94% (01 May 2022 04:49) (94% - 98%)      PHYSICAL EXAM:    GENERAL: NAD  HEAD:  Atraumatic, Normocephalic  CHEST/LUNG: Clear to ausculation, bilaterally   HEART: RRR S1S2  ABDOMEN: non distended, +BS, soft, non tender, no guarding  EXTREMITIES:  calf soft, non tender b/l    I&O's Detail    2022 07:01  -  2022 07:00  --------------------------------------------------------  IN:    sodium chloride 0.9%: 640 mL  Total IN: 640 mL    OUT:  Total OUT: 0 mL    Total NET: 640 mL      2022 07:01  -  01 May 2022 06:23  --------------------------------------------------------  IN:    Oral Fluid: 240 mL  Total IN: 240 mL    OUT:  Total OUT: 0 mL    Total NET: 240 mL          LABS:                        12.7   9.10  )-----------( 243      ( 2022 08:14 )             39.6     04-30    139  |  105  |  19  ----------------------------<  119<H>  4.1   |  28  |  1.84<H>    Ca    9.0      2022 08:14    TPro  7.9  /  Alb  3.4  /  TBili  1.3<H>  /  DBili  x   /  AST  13<L>  /  ALT  23  /  AlkPhos  93  04-29    PT/INR - ( 2022 08:14 )   PT: 13.4 sec;   INR: 1.12 ratio         PTT - ( 2022 08:14 )  PTT:30.5 sec  Urinalysis Basic - ( 2022 16:02 )    Color: Yellow / Appearance: Clear / S.010 / pH: x  Gluc: x / Ketone: Negative  / Bili: Negative / Urobili: Negative mg/dL   Blood: x / Protein: 30 mg/dL / Nitrite: Negative   Leuk Esterase: Negative / RBC: 11-25 /HPF / WBC 0-2   Sq Epi: x / Non Sq Epi: x / Bacteria: Moderate        
POST-OPERATIVE NOTE    Subjective: patient doing well seated in chair. Voiding with blood tinged urine as per patient.   Patient is s/p cystoscopy and stent placement . Recovering appropriately.     Vital Signs Last 24 Hrs  T(C): 37 (30 Apr 2022 19:20), Max: 37.6 (29 Apr 2022 19:48)  T(F): 98.6 (30 Apr 2022 19:20), Max: 99.7 (29 Apr 2022 19:48)  HR: 99 (30 Apr 2022 19:20) (88 - 103)  BP: 147/93 (30 Apr 2022 19:20) (117/88 - 150/91)  BP(mean): --  RR: 17 (30 Apr 2022 19:20) (15 - 26)  SpO2: 96% (30 Apr 2022 19:20) (91% - 98%)  I&O's Detail    29 Apr 2022 07:01  -  30 Apr 2022 07:00  --------------------------------------------------------  IN:    sodium chloride 0.9%: 640 mL  Total IN: 640 mL    OUT:  Total OUT: 0 mL    Total NET: 640 mL          PAST MEDICAL & SURGICAL HISTORY:  renal mass     Hypertension      Physical Exam:  General: NAD, resting comfortably in chair  Pulmonary: Nonlabored breathing, no respiratory distress  Cardiovascular: NSR  Abdominal: soft, NT/ND, protruberant  : voiding with hematuria  Extremities: [facility]      LABS:                        12.7   9.10  )-----------( 243      ( 30 Apr 2022 08:14 )             39.6     04-30    139  |  105  |  19  ----------------------------<  119<H>  4.1   |  28  |  1.84<H>    Ca    9.0      30 Apr 2022 08:14    TPro  7.9  /  Alb  3.4  /  TBili  1.3<H>  /  DBili  x   /  AST  13<L>  /  ALT  23  /  AlkPhos  93  04-29    PT/INR - ( 30 Apr 2022 08:14 )   PT: 13.4 sec;   INR: 1.12 ratio         PTT - ( 30 Apr 2022 08:14 )  PTT:30.5 sec  CAPILLARY BLOOD GLUCOSE        Assessment:  The patient is a 51y Male who is now several hours post-op from a cystoscopy and ureteral stent placement. Has noted renal mass as well that will require further intervention in the near future    Plan:  - Pain control as needed  - DVT ppx  - OOB and ambulating as tolerated  - F/u AM labs  - will need surgical oncology for nephrectomy vs tumor removal for renal mass. 
SURGERY PROGRESS HPI:  Pt seen and examined at bedside. Pain is well controlled on pain medication. Pt denies complaints. No acute events overnight. Voiding well, without hematuria. Pt denies chest pain, SOB, dizziness, fever, chills. Ambulating.      Vital Signs Last 24 Hrs  T(C): 37.1 (2022 05:15), Max: 37.9 (2022 18:53)  T(F): 98.7 (2022 05:15), Max: 100.3 (2022 18:53)  HR: 98 (2022 05:15) (18 - 98)  BP: 149/91 (2022 05:15) (117/88 - 149/91)  BP(mean): --  RR: 17 (2022 05:15) (16 - 18)  SpO2: 91% (2022 05:15) (91% - 97%)      PHYSICAL EXAM:    GENERAL: NAD  HEAD:  Atraumatic, Normocephalic  CHEST/LUNG: Clear to ausculation, bilaterally   HEART: RRR S1S2  ABDOMEN: non distended, +BS, soft, non tender, no guarding  EXTREMITIES:  calf soft, non tender b/l    I&O's Detail    2022 07:01  -  2022 06:16  --------------------------------------------------------  IN:    sodium chloride 0.9%: 640 mL  Total IN: 640 mL    OUT:  Total OUT: 0 mL    Total NET: 640 mL          LABS:                        12.9   10.38 )-----------( 228      ( 2022 14:40 )             38.7         137  |  101  |  18  ----------------------------<  93  3.7   |  32<H>  |  1.72<H>    Ca    9.2      2022 14:40    TPro  7.9  /  Alb  3.4  /  TBili  1.3<H>  /  DBili  x   /  AST  13<L>  /  ALT  23  /  AlkPhos  93        Urinalysis Basic - ( 2022 16:02 )    Color: Yellow / Appearance: Clear / S.010 / pH: x  Gluc: x / Ketone: Negative  / Bili: Negative / Urobili: Negative mg/dL   Blood: x / Protein: 30 mg/dL / Nitrite: Negative   Leuk Esterase: Negative / RBC: 11-25 /HPF / WBC 0-2   Sq Epi: x / Non Sq Epi: x / Bacteria: Moderate

## 2022-05-01 NOTE — DISCHARGE NOTE NURSING/CASE MANAGEMENT/SOCIAL WORK - NSDCPEFALRISK_GEN_ALL_CORE
For information on Fall & Injury Prevention, visit: [email or web address] OR  [email or web address] OR  https://www.cdc.gov/steadi/patient.html

## 2022-05-01 NOTE — DISCHARGE NOTE PROVIDER - TIME SPENT: (MINUTES SPENT ON THE DISCHARGE SERVICE)
Patient: Fouzia Corrigan MRN: [id number]  SSN: [id number]    YOB: 1958  Age: 70 y.o. Sex: female      Other Providers:  Dr. Tiana Vera, Dr. Rodney Terry: Dyspnea    DIAGNOSIS: Stage I SCC of the SARA    PREVIOUS RADIATION TREATMENT:  1) 5000cGy in 5 fractions, completed 3/19/2021    HISTORY OF PRESENT ILLNESS:  Fouzia Corrigan is a 70 y.o. female who I am seeing at the request of Dr. Tiana Vera. Ms. Mannie Dow has a history of COPD and multiple recurrent pneumonias with frequent admissions who is a former smoker, quit in 07/21. During a workup for shortness of breath a CT chest was obtained 8/23/2020 which demonstrated a rounded pulmonary nodule in the posterior left upper lobe measuring 1.5 x 1.5cm concerning for primary malignancy. She underwent a repeat CT chest on 12/17/2020 for acute respiratory failure which also demonstrated the spiculated SARA mass measuring 1.8 x 2.0cm, increased in size compared to prior exams, as well as several conspicuous mediastinal lymph nodes including a high right paratracheal node, left hilar node, and right hilar and infrahilar adenopathy. A PET/CT was performed 1/14/2021 which was significant for the left upper lobe spiculated mass abutting the interlobular fissure similar in size to the prior CT, SUV max 4.7. The previously noted adenopathy showed only mildly elevated FDG avidity. On 1/21/2020 she underwent bronchoscopy and biopsy. The left upper lobe lung node was consistent with squamous cell carcinoma. FNA of 11R and  7 lymph nodes were negative for malignant cells. FNA of 11L was nondiagnostic consisting mostly of blood. She was treated with definitive SBRT, completed 3/19/2021. INTERVAL HISTORY:  Ms. Mannie Dow reports mild fatigue following radiation treatment but denies any worsening shortness of breath, cough, fevers, or chills. She has severe and persistent agoraphobia and has missed appointments with Dr. Tiana Vera and Dr. Vesta Romeo.  She underwent a CT of the chest 5/11/2021 which demonstrated an interval decrease in the size of her treated SARA nodule, now measuring 1.8x 1.6cm as compared to 2.0 x 1.8cm. In addition, there was a decrease in size of a RUL 3mm nodule (previously 5mm). PAST MEDICAL HISTORY:    Past Medical History:   Diagnosis Date    Acrophobia     Anxiety     COPD [facility]ch[facility] obstructive [facility] disease) ([facility] [address]     Diabetes ([facility] [address]     no meds    Hypertension     no meds       The patient denies history of collagen vascular diseases, pacemaker insertion, prior radiation or prior chemotherapy. PAST SURGICAL HISTORY:   Past Surgical History:   Procedure Laterality Date    HX GYN      cervical cone       MEDICATIONS:     Current Outpatient Medications:     budesonide-formoteroL (Symbicort) 160-4.5 mcg/actuation HFAA, TAKE 2 PUFFS BY MOUTH TWICE A DAY, Disp: 10.2 Inhaler, Rfl: 0    clonazePAM (KlonoPIN) 0.5 mg tablet, Take 1 Tab by mouth two (2) times a day. Max Daily Amount: 1 mg., Disp: 90 Tab, Rfl: 0    amLODIPine (NORVASC) 5 mg tablet, Take 1 Tab by mouth daily. , Disp: 30 Tab, Rfl: 5    multivitamin (ONE A DAY) tablet, Take 1 Tab by mouth daily. , Disp: , Rfl:     mv-mn/C/glutamin/lysin/lxzl900 (AIRBORNE, ASCORBATE SODIUM, PO), Take 1 Gum by mouth daily. , Disp: , Rfl:     albuterol (ProAir HFA) 90 mcg/actuation inhaler, Take 2 Puffs by inhalation every four (4) hours as needed for Wheezing., Disp: 1 Inhaler, Rfl: 5    Nebulizer & Compressor machine, J43.2,r91.1,j96.11   Use every 4 hr with respules, Disp: 1 Each, Rfl: 0    Nebulizer Accessories kit, Use with nebulizer machine, Disp: 1 Kit, Rfl: 0    OXYGEN-AIR DELIVERY SYSTEMS, 3 L by Nasal route continuous. Using as needed. , Disp: , Rfl:     ALLERGIES:   Allergies   Allergen Reactions    Influenza Virus Vaccines Anaphylaxis       SOCIAL HISTORY:   Social History     Socioeconomic History    Marital status:      Spouse name: Not on file    Number of children: Not on file  Years of education: Not on file    Highest education level: Not on file   Occupational History    Not on file   Social Needs    Financial resource strain: Not on file    Food insecurity     Worry: Not on file     Inability: Not on file    Transportation needs     Medical: Not on file     Non-medical: Not on file   Tobacco Use    Smoking status: Former Smoker     Packs/day: 1.00     Years: 40.00     Pack years: 40.00     Types: Cigars, Cigarettes    Smokeless tobacco: Never Used   Substance and Sexual Activity    Alcohol use: No    Drug use: No    Sexual activity: Not Currently   Lifestyle    Physical activity     Days per week: Not on file     Minutes per session: Not on file    Stress: Not on file   Relationships    Social connections     Talks on phone: Not on file     Gets together: Not on file     Attends [employer] service: Not on file     Active member of club or organization: Not on file     Attends meetings of clubs or organizations: Not on file     Relationship status: Not on file    Intimate partner violence     Fear of current or ex partner: Not on file     Emotionally abused: Not on file     Physically abused: Not on file     Forced sexual activity: Not on file   Other Topics Concern    Not on file   Social History Narrative    Not on file       FAMILY HISTORY:   Family History   Problem Relation Age of Onset    Cancer Mother     Liver Disease Father     Alcohol abuse Sister     Diabetes Brother     Hypertension Brother     Heart Disease Sister        REVIEW OF SYSTEMS:   A full 12-point review of systems was completed and was negative unless noted in the history of present illness.     PHYSICAL EXAMINATION:   ECOG Performance status 1  VITAL SIGNS:   Visit Vitals  BP (!) 175/75 (BP 1 Location: Left upper arm, BP Patient Position: Sitting)   Pulse 87   Temp 97.8 °F (36.6 °C)   Wt 67.1 kg (148 lb)   SpO2 94%   BMI 26.22 kg/m²       General: well developed/nourished adult Female in no acute distress; appears stated age  [de-identified]: normocephalic, atraumatic; EOMI  Neck: supple with full ROM; no cervical lymphadenopathy  Respiratory: normal inspiratory effort, no audible wheezes  Extremities: no cyanosis, clubbing, or edema  Musculoskeletal: mobility intact x4; normal ROM in all joints  Skin: no skin lesions identified  Neuro: AOx3; sensation intact x 4; CNII-XII grossly intact  Psych: appropriate affect, insight, and judgement    PATHOLOGY:    No interval pathology. LABORATORY:   Lab Results   Component Value Date/Time    Sodium 141 12/18/2020 04:05 AM    Potassium 3.7 12/18/2020 04:05 AM    Chloride 106 12/18/2020 04:05 AM    CO2 27 12/18/2020 04:05 AM    Anion gap 8 12/18/2020 04:05 AM    Glucose 68 12/18/2020 04:05 AM    BUN 8 12/18/2020 04:05 AM    Creatinine 0.44 (L) 12/18/2020 04:05 AM    GFR est AA >60 12/18/2020 04:05 AM    GFR est non-AA >60 12/18/2020 04:05 AM    Calcium 9.0 12/18/2020 04:05 AM    Magnesium 1.6 (L) 12/15/2020 11:35 PM    Phosphorus 3.5 08/13/2020 05:40 AM    Albumin 3.2 12/15/2020 11:35 PM    Protein, total 7.3 12/15/2020 11:35 PM    Globulin 4.1 (H) 12/15/2020 11:35 PM    A-G Ratio 0.8 (L) 12/15/2020 11:35 PM    ALT (SGPT) 22 12/15/2020 11:35 PM     Lab Results   Component Value Date/Time    WBC 8.0 12/18/2020 04:05 AM    HGB 11.6 (L) 12/18/2020 04:05 AM    HCT 38.3 12/18/2020 04:05 AM    PLATELET 198 03/39/5713 04:05 AM       RADIOLOGY:    I personally reviewed the images and agree with the findings as documented in the HPI. IMPRESSION:  Soto Lino is a 70 y.o. female with recently diagnosed stage I SCC of the left upper lobe s/p definitive SBRT without residual toxicities from radiation therapy or evidence of new or progressive disease.       PLAN:    1) CT chest without contrast and follow up in 6 months   2) Encouraged follow up with pulmonology and PCP for ongoing management of COPD, hypertension, and anxiety    Tasia Monahan MD   May 18, 2021 38

## 2022-05-01 NOTE — PROGRESS NOTE ADULT - ASSESSMENT
Pt with proximal right ureteral stone approx 1cm, right renal mass     Plan:  Pt is NPO, on pain control  Plan for OR this afternoon for cystoscopy, right ureteral stent insertion.   outpt f/u for mass
51 years old male    h/p  HTN       c/o two days of abd pain and right flank pain   had Pfizer boot shot this wk.   he took tylenol and motrin at 10:00 am this morning., and pain was  bit better    denies headache, dizziness, cough, sob, chest pain, nausea vomiting, fever, chills, abd pain, dysuria       *  admitted with abd/ right flank pain for   4  to 5  days   pt  with right renal   stone, 1  cm/  renal mass  CT a/p, 1cn prox Right ureteral calculus,  12 cm   by 12  cm  right renal mass  on iv fluids   iv Dilaudid  strain urine  pt is afebrile now  - Appreciate urology recs- plan for cystoscopy w/ RT ureteral stent insertion today. Will need outpatient follow up for 1 cm right renal mass      *  ADELINA   on iv fluids.  and hctz   was held  *  HTN   on  hyzaar, norvasc.  labetolol  *  on dvt ppx / PAS/  awaiting  OR in am  check cxr/ ekg     RAD< from: CT Abdomen and Pelvis w/ IV Cont (04.29.22 @ 15:42) >  IMPRESSION:  1 cm right proximal ureteral calculus with mild hydronephrosis and   obstructive uropathy.  12.8 x 11.9 x 12.1 cm right lower pole renal mass suspicious for   neoplasm. Complex cyst and abscess are not excluded however neoplasm is   favored. Recommend follow-up CT or MRI with renal mass protocol and/or   ultrasound for additional characterization. Limited evaluation of the   right renal vein and IVC secondary to phase of injection.  Findings were discussed with Dr. STEFANY ZULUAGA [id number] 4/29/2022 4:03 PM by   Dr. Ro Akhtar with read back confirmation.  < end of copied text >  
Assessment:  51M, POD#1 from a cystoscopy and ureteral stent placement. Has noted renal mass as well that will require further intervention in the near future    Plan:  - Pain control as needed  - DVT ppx  - OOB and ambulating as tolerated  - F/u AM labs  - will need surgical oncology for nephrectomy vs tumor removal for renal mass.

## 2022-05-01 NOTE — DISCHARGE NOTE PROVIDER - NSDCMRMEDTOKEN_GEN_ALL_CORE_FT
amLODIPine 10 mg oral tablet: 1 tab(s) orally once a day  labetalol 300 mg oral tablet: 1 tab(s) orally 2 times a day  losartan-hydrochlorothiazide 100 mg-25 mg oral tablet: 1 tab(s) orally once a day

## 2022-05-01 NOTE — DISCHARGE NOTE PROVIDER - HOSPITAL COURSE
50 y/o M with PMHx of HTN presents c/o 2 days of abdominal and right flank pain.  Last BM this morning.  Denies h/a, dizziness, cough, SOB< bhest pain, N/V/D, fever, chills or urinary complaints.  Patient reports taking Pfizer booster this week.    CT A/P done in ED and shows 1 cm right proximal ureteral calculus with mild hydronephrosis and obstructive uropathy; 12.8 x 11.9 x 12.1 cm right lower pole renal mass suspicious for neoplasm; Complex cyst and abscess are not excluded however neoplasm is favored; Recommend follow-up CT or MRI with renal mass protocol and/or   ultrasound for additional characterization; Limited evaluation of the right renal vein and IVC secondary to phase of injection.  Patient seen by urology and underwent cystoscopy and R ureteral stent placement.  Pain controlled post-op and patient stable for discharge with urology follow-up outpatient.  Patient will need surgical oncology evaluation for nephrectomy vs tumor removal for renal mass.    50 y/o M with PMHx of HTN presents c/o 2 days of abdominal and right flank pain.  Last BM this morning.  Denies h/a, dizziness, cough, SOB< bhest pain, N/V/D, fever, chills or urinary complaints.  Patient reports taking Pfizer booster this week.    CT A/P done in ED and shows 1 cm right proximal ureteral calculus with mild hydronephrosis and obstructive uropathy; 12.8 x 11.9 x 12.1 cm right lower pole renal mass suspicious for neoplasm; Complex cyst and abscess are not excluded however neoplasm is favored; Recommend follow-up CT or MRI with renal mass protocol and/or   ultrasound for additional characterization; Limited evaluation of the right renal vein and IVC secondary to phase of injection.  Patient seen by urology and underwent cystoscopy and R ureteral stent placement.  Pain controlled post-op and patient stable for discharge with urology follow-up outpatient.  Patient will need surgical oncology evaluation for nephrectomy vs tumor removal for renal mass.       Patient history of morbid obesity present on admission as evidenced by BMI of 41.1

## 2022-05-01 NOTE — DISCHARGE NOTE PROVIDER - CARE PROVIDER_API CALL
Tyron Mejia)  Urology  [address] [place] [address]  Phone: [phone]  Fax: [phone]  Follow Up Time: 1 week

## 2022-05-01 NOTE — DISCHARGE NOTE PROVIDER - NSDCCPCAREPLAN_GEN_ALL_CORE_FT
PRINCIPAL DISCHARGE DIAGNOSIS  Diagnosis: Right kidney stone  Assessment and Plan of Treatment:       SECONDARY DISCHARGE DIAGNOSES  Diagnosis: Renal mass  Assessment and Plan of Treatment:      PRINCIPAL DISCHARGE DIAGNOSIS  Diagnosis: Right kidney stone  Assessment and Plan of Treatment: You underwent a cystoscopy with ureteral stent placment. Please follow up with urology in the outpatient setting.      SECONDARY DISCHARGE DIAGNOSES  Diagnosis: Renal mass  Assessment and Plan of Treatment: Please follow up with urology in the outpatient setting.     PRINCIPAL DISCHARGE DIAGNOSIS  Diagnosis: Right kidney stone  Assessment and Plan of Treatment: You underwent a cystoscopy with ureteral stent placment. Please follow up with urology in the outpatient setting.      SECONDARY DISCHARGE DIAGNOSES  Diagnosis: Morbid obesity  Assessment and Plan of Treatment:     Diagnosis: Renal mass  Assessment and Plan of Treatment: Please follow up with urology in the outpatient setting.

## 2022-05-01 NOTE — DISCHARGE NOTE NURSING/CASE MANAGEMENT/SOCIAL WORK - PATIENT PORTAL LINK FT
You can access the FollowMyHealth Patient Portal offered by [facility] by registering at the following website: [email or web address]. By joining [facility]’s FollowMyHealth portal, you will also be able to view your health information using other applications (apps) compatible with our system.

## 2022-05-05 LAB — NON-GYNECOLOGICAL CYTOLOGY STUDY: SIGNIFICANT CHANGE UP

## 2022-05-13 ENCOUNTER — EMERGENCY (EMERGENCY)
Facility: HOSPITAL | Age: 52
LOS: 0 days | Discharge: HOME | End: 2022-05-13
Attending: EMERGENCY MEDICINE
Payer: SELF-PAY

## 2022-05-13 VITALS
RESPIRATION RATE: 16 BRPM | WEIGHT: 272.05 LBS | HEIGHT: 68 IN | SYSTOLIC BLOOD PRESSURE: 130 MMHG | DIASTOLIC BLOOD PRESSURE: 85 MMHG | HEART RATE: 80 BPM | OXYGEN SATURATION: 98 % | TEMPERATURE: 98 F

## 2022-05-13 VITALS
SYSTOLIC BLOOD PRESSURE: 123 MMHG | TEMPERATURE: 99 F | OXYGEN SATURATION: 99 % | HEART RATE: 76 BPM | RESPIRATION RATE: 16 BRPM | DIASTOLIC BLOOD PRESSURE: 92 MMHG

## 2022-05-13 DIAGNOSIS — R10.11 RIGHT UPPER QUADRANT PAIN: ICD-10-CM

## 2022-05-13 DIAGNOSIS — Z20.822 CONTACT WITH AND (SUSPECTED) EXPOSURE TO COVID-19: ICD-10-CM

## 2022-05-13 DIAGNOSIS — D72.829 ELEVATED WHITE BLOOD CELL COUNT, UNSPECIFIED: ICD-10-CM

## 2022-05-13 DIAGNOSIS — I10 ESSENTIAL (PRIMARY) HYPERTENSION: ICD-10-CM

## 2022-05-13 DIAGNOSIS — Z96.0 PRESENCE OF UROGENITAL IMPLANTS: ICD-10-CM

## 2022-05-13 DIAGNOSIS — N28.89 OTHER SPECIFIED DISORDERS OF KIDNEY AND URETER: ICD-10-CM

## 2022-05-13 LAB
ALBUMIN SERPL ELPH-MCNC: 3.5 G/DL — SIGNIFICANT CHANGE UP (ref 3.3–5)
ALP SERPL-CCNC: 94 U/L — SIGNIFICANT CHANGE UP (ref 40–120)
ALT FLD-CCNC: 30 U/L — SIGNIFICANT CHANGE UP (ref 12–78)
ANION GAP SERPL CALC-SCNC: 8 MMOL/L — SIGNIFICANT CHANGE UP (ref 5–17)
APPEARANCE UR: CLEAR — SIGNIFICANT CHANGE UP
APTT BLD: 31.9 SEC — SIGNIFICANT CHANGE UP (ref 27.5–35.5)
AST SERPL-CCNC: 13 U/L — LOW (ref 15–37)
BASOPHILS # BLD AUTO: 0.03 K/UL — SIGNIFICANT CHANGE UP (ref 0–0.2)
BASOPHILS NFR BLD AUTO: 0.4 % — SIGNIFICANT CHANGE UP (ref 0–2)
BILIRUB SERPL-MCNC: 0.7 MG/DL — SIGNIFICANT CHANGE UP (ref 0.2–1.2)
BILIRUB UR-MCNC: NEGATIVE — SIGNIFICANT CHANGE UP
BLD GP AB SCN SERPL QL: SIGNIFICANT CHANGE UP
BUN SERPL-MCNC: 13 MG/DL — SIGNIFICANT CHANGE UP (ref 7–23)
CALCIUM SERPL-MCNC: 9.3 MG/DL — SIGNIFICANT CHANGE UP (ref 8.5–10.1)
CHLORIDE SERPL-SCNC: 106 MMOL/L — SIGNIFICANT CHANGE UP (ref 96–108)
CO2 SERPL-SCNC: 29 MMOL/L — SIGNIFICANT CHANGE UP (ref 22–31)
COLOR SPEC: YELLOW — SIGNIFICANT CHANGE UP
CREAT SERPL-MCNC: 1.06 MG/DL — SIGNIFICANT CHANGE UP (ref 0.5–1.3)
DIFF PNL FLD: ABNORMAL
EGFR: 84 ML/MIN/1.73M2 — SIGNIFICANT CHANGE UP
EOSINOPHIL # BLD AUTO: 0.33 K/UL — SIGNIFICANT CHANGE UP (ref 0–0.5)
EOSINOPHIL NFR BLD AUTO: 4.4 % — SIGNIFICANT CHANGE UP (ref 0–6)
FLUAV AG NPH QL: SIGNIFICANT CHANGE UP
FLUBV AG NPH QL: SIGNIFICANT CHANGE UP
GLUCOSE SERPL-MCNC: 78 MG/DL — SIGNIFICANT CHANGE UP (ref 70–99)
GLUCOSE UR QL: NEGATIVE MG/DL — SIGNIFICANT CHANGE UP
HCT VFR BLD CALC: 40.8 % — SIGNIFICANT CHANGE UP (ref 39–50)
HGB BLD-MCNC: 13.2 G/DL — SIGNIFICANT CHANGE UP (ref 13–17)
IMM GRANULOCYTES NFR BLD AUTO: 0.4 % — SIGNIFICANT CHANGE UP (ref 0–1.5)
INR BLD: 1.04 RATIO — SIGNIFICANT CHANGE UP (ref 0.88–1.16)
KETONES UR-MCNC: NEGATIVE — SIGNIFICANT CHANGE UP
LEUKOCYTE ESTERASE UR-ACNC: NEGATIVE — SIGNIFICANT CHANGE UP
LYMPHOCYTES # BLD AUTO: 1.88 K/UL — SIGNIFICANT CHANGE UP (ref 1–3.3)
LYMPHOCYTES # BLD AUTO: 24.8 % — SIGNIFICANT CHANGE UP (ref 13–44)
MCHC RBC-ENTMCNC: 25 PG — LOW (ref 27–34)
MCHC RBC-ENTMCNC: 32.4 G/DL — SIGNIFICANT CHANGE UP (ref 32–36)
MCV RBC AUTO: 77.3 FL — LOW (ref 80–100)
MONOCYTES # BLD AUTO: 0.64 K/UL — SIGNIFICANT CHANGE UP (ref 0–0.9)
MONOCYTES NFR BLD AUTO: 8.5 % — SIGNIFICANT CHANGE UP (ref 2–14)
NEUTROPHILS # BLD AUTO: 4.66 K/UL — SIGNIFICANT CHANGE UP (ref 1.8–7.4)
NEUTROPHILS NFR BLD AUTO: 61.5 % — SIGNIFICANT CHANGE UP (ref 43–77)
NITRITE UR-MCNC: NEGATIVE — SIGNIFICANT CHANGE UP
NRBC # BLD: 0 /100 WBCS — SIGNIFICANT CHANGE UP (ref 0–0)
PH UR: 8 — SIGNIFICANT CHANGE UP (ref 5–8)
PLATELET # BLD AUTO: 322 K/UL — SIGNIFICANT CHANGE UP (ref 150–400)
POTASSIUM SERPL-MCNC: 3.7 MMOL/L — SIGNIFICANT CHANGE UP (ref 3.5–5.3)
POTASSIUM SERPL-SCNC: 3.7 MMOL/L — SIGNIFICANT CHANGE UP (ref 3.5–5.3)
PROT SERPL-MCNC: 7.8 GM/DL — SIGNIFICANT CHANGE UP (ref 6–8.3)
PROT UR-MCNC: NEGATIVE MG/DL — SIGNIFICANT CHANGE UP
PROTHROM AB SERPL-ACNC: 12.5 SEC — SIGNIFICANT CHANGE UP (ref 10.5–13.4)
RBC # BLD: 5.28 M/UL — SIGNIFICANT CHANGE UP (ref 4.2–5.8)
RBC # FLD: 14.6 % — HIGH (ref 10.3–14.5)
RBC CASTS # UR COMP ASSIST: ABNORMAL /HPF (ref 0–4)
SARS-COV-2 RNA SPEC QL NAA+PROBE: SIGNIFICANT CHANGE UP
SODIUM SERPL-SCNC: 143 MMOL/L — SIGNIFICANT CHANGE UP (ref 135–145)
SP GR SPEC: 1.01 — SIGNIFICANT CHANGE UP (ref 1.01–1.02)
UROBILINOGEN FLD QL: NEGATIVE MG/DL — SIGNIFICANT CHANGE UP
WBC # BLD: 7.57 K/UL — SIGNIFICANT CHANGE UP (ref 3.8–10.5)
WBC # FLD AUTO: 7.57 K/UL — SIGNIFICANT CHANGE UP (ref 3.8–10.5)
WBC UR QL: ABNORMAL

## 2022-05-13 PROCEDURE — 99285 EMERGENCY DEPT VISIT HI MDM: CPT

## 2022-05-13 PROCEDURE — 74176 CT ABD & PELVIS W/O CONTRAST: CPT | Mod: 26,MA

## 2022-05-13 NOTE — ED PROVIDER NOTE - PATIENT PORTAL LINK FT
You can access the FollowMyHealth Patient Portal offered by [facility] by registering at the following website: [email or web address]. By joining [facility]’s FollowMyHealth portal, you will also be able to view your health information using other applications (apps) compatible with our system.

## 2022-05-13 NOTE — CONSULT NOTE ADULT - SUBJECTIVE AND OBJECTIVE BOX
UROLOGY CONSULT NOTE    HPI:  51 year old male with PMH of HTN, R flank pressure presentation, pt had R cystoscopy with ureteral stent 2 weeks ago with Dr Mejia, with noted kidney mass. Pt denies any fever/chills and no nausea/vomiting.     Urology consulted. History as per above. Patient denies any fever/chills, nausea/vomiting, dysuria. WBC 7, Cr 1.06. Position of stone and stent unchanged    PAST MEDICAL & SURGICAL HISTORY:  Hypertension  Sickle cell trait  Right nephrolithiasis  No significant past surgical history    REVIEW OF SYSTEMS:  Constitutional: Denies fever, weight loss, fatigue  Eye: Denies eye pain, visual changes, discharge, blurred vision  ENT: Denies hearing changes, tinnitus, vertigo, sinus congestion, sore throat  Neck: Denies pain or stiffness  Respiratory: Denies cough, wheezing, chills, hemoptysis, shortness of breath, difficulty breathing  Cardiovascular: Denies chest pain, palpitations, dizziness, leg swelling  Gastrointestinal: Denies abdominal pain, nausea, vomiting, hematemesis, diarrhea, constipation, melena, hematochezia  Genitourinary: Denies dysuria, frequency, hematuria, retention, incontinence  Neurological: Denies headaches, memory loss, loss of strength, numbness, tremors  Skin: Denies itching, burning, rashes, lesions   Endocrine: Denies heat or cold intolerance, hair loss  Musculoskeletal: Denies joint pain or swelling, back, extremity pain  Psychiatric: Denies depression, anxiety, mood swings, difficulty sleeping, suicidal ideation  Hematology: Denies easy bruising, bleeding gums  Immunologic: Denies hives or eczema    MEDICATIONS  (STANDING):    MEDICATIONS  (PRN):      Allergies  No Known Allergies    SOCIAL HISTORY          Smoking: Yes [ ]  No [x ]   ______pk yrs          ETOH  Yes [ ]  No [x ]  Social [ ]          DRUGS:  Yes [ ]  No [ x]  if so what______________    FAMILY HISTORY:      Vital Signs Last 24 Hrs  T(C): 37 (13 May 2022 19:20), Max: 37 (13 May 2022 19:20)  T(F): 98.6 (13 May 2022 19:20), Max: 98.6 (13 May 2022 19:20)  HR: 76 (13 May 2022 19:20) (74 - 80)  BP: 123/92 (13 May 2022 19:20) (123/87 - 133/-)  BP(mean): --  RR: 16 (13 May 2022 19:20) (16 - 17)  SpO2: 99% (13 May 2022 19:20) (98% - 99%)    Physical Exam:    GENERAL: NAD, well-groomed, thin  HEAD:  Atraumatic, Normocephalic  EYES: EOMI, PERRLA, conjunctiva and sclera clear  NECK: Supple, No JVD, Normal thyroid  NERVOUS SYSTEM:  Alert & Oriented X3, Good concentration  CHEST/LUNG: Clear to percussion bilaterally  HEART: Regular rate and rhythm  ABDOMEN: Soft, mildly diffuse tenderness, Nondistended  EXTREMITIES:  2+ Peripheral Pulses  LYMPH: No cervical adenopathy  : No suprapubic tenderness, no bladder distention, no gross hematuria.  SKIN: No rashes or lesions    LABS:                        13.2   7.57  )-----------( 322      ( 13 May 2022 16:50 )             40.8     05    143  |  106  |  13  ----------------------------<  78  3.7   |  29  |  1.06    Ca    9.3      13 May 2022 16:50    TPro  7.8  /  Alb  3.5  /  TBili  0.7  /  DBili  x   /  AST  13<L>  /  ALT  30  /  AlkPhos  94  05-    PT/INR - ( 13 May 2022 16:50 )   PT: 12.5 sec;   INR: 1.04 ratio         PTT - ( 13 May 2022 16:50 )  PTT:31.9 sec  Urinalysis Basic - ( 13 May 2022 17:29 )    Color: Yellow / Appearance: Clear / S.010 / pH: x  Gluc: x / Ketone: Negative  / Bili: Negative / Urobili: Negative mg/dL   Blood: x / Protein: Negative mg/dL / Nitrite: Negative   Leuk Esterase: Negative / RBC: 3-5 /HPF / WBC 11-25   Sq Epi: x / Non Sq Epi: x / Bacteria: x        RADIOLOGY & ADDITIONAL STUDIES:  ACC: [id number] EXAM:  CT RENAL STONE HUNT                          PROCEDURE DATE:  2022          INTERPRETATION:  CLINICAL INFORMATION: 51-year-old man with right flank   pain. Recent ureteral stent    COMPARISON: CT scan 2022    CONTRAST/COMPLICATIONS:  IV Contrast: NONE  Oral Contrast: NONE  Complications: None reported at time of study completion    PROCEDURE:  CT of the Abdomen and Pelvis was performed.  Sagittal and coronal reformats were performed.    FINDINGS:  LOWER CHEST: 6mm right lower lobe subpleural nodule new since ,   possibly infectious.    LIVER: Within normal limits.  BILE DUCTS: Normal caliber.  GALLBLADDER: Within normal limits.  SPLEEN: Within normal limits.  PANCREAS: Within normal limits.  ADRENALS: Within normal limits.  KIDNEYS/URETERS: Low-attenuation 12 cm inferior right renal mass right   ureteral stent extending from the renal pelvis to the urinary bladder.   Proximal 7 mm right ureteral calculus unchanged in position. Resolution   right hydronephrosis.    BLADDER: Within normal limits.  REPRODUCTIVE ORGANS: Normal size prostate    BOWEL: No bowel obstruction. Appendix normal.  PERITONEUM: No ascites.  VESSELS: Within normal limits.  RETROPERITONEUM/LYMPH NODES: No lymphadenopathy.  ABDOMINAL WALL: Within normal limits.  BONES: Degenerative change.    IMPRESSION:  Resolution right hydronephrosis with indwelling ureteral stent.  Proximal right ureteral calculus unchanged in position        --- End of Report ---

## 2022-05-13 NOTE — ED PROVIDER NOTE - CLINICAL SUMMARY MEDICAL DECISION MAKING FREE TEXT BOX
R flank pressure, no CVA tenderness, will evaluate stent with labs/ua and CT - Urology PA aware. R flank pressure, no CVA tenderness, will evaluate stent with labs/ua and CT - Urology PA aware.    UA results noted with WBC count elevation, discussed use of abx- was discussed that pt with stent - may have this WBC count in UA - pt otherwise without dysuria or fever - as with urology rec - will dc without abx and await UCx results prior to decision on abx use.

## 2022-05-13 NOTE — CONSULT NOTE ADULT - ASSESSMENT
Impression: 51 year old male with PMH of HTN, R flank pressure presentation, pt had R cystoscopy with ureteral stent 2 weeks ago with Dr Mejia, with noted kidney mass.    Recommendations:  --No abx at this time, follow up ucx  --Followup outpatient for surgery planning with Dr. Mejia

## 2022-05-13 NOTE — ED ADULT NURSE NOTE - OBJECTIVE STATEMENT
Pt presents to ED c/o 4/10 right flank pain s/p stent placement 2 weeks ago. Pt states he was dx with 1 cm right renal stone 2 weeks ago, had a stent placed by Dr. Neri. Pt states he was also supposed to have a lithotripsy done this week to "break up the stone because it was not removed". Pt states that his urologist told him that they needed to "dilute his kidneys" first and make sure that his "kidneys were clearing" before going through with the lithotripsy. Pt states he has not heard back from his urologist in regards to lithotripsy since an office appointment last week. Pt denies fever/chills, hematuria, N/V/D, abdominal pain, CP, SOB, dizziness, back pain, weakness. Respirations even and unlabored. NAD noted. VSS as per flow sheet.

## 2022-05-13 NOTE — ED ADULT TRIAGE NOTE - PRO INTERPRETER NEED 2
Caller verified medication dosage, frequency and dispense quantity. The medication(s) are set up as pending and waiting for your approval. The preferred pharmacy has been set up and verified.      English

## 2022-05-13 NOTE — ED ADULT NURSE NOTE - ED STAT RN HANDOFF DETAILS
Report received from EVETTE Young at 7pm. Assessment available on KB. Pt sitting up in bed, A&Ox3, appears in NAD. Pending dispo. will continue to monitor.

## 2022-05-13 NOTE — ED PROVIDER NOTE - OBJECTIVE STATEMENT
51 year old male with PMH of HTN, R flank pressure presentation, pt had R cystoscopy with ureteral stent 2 weeks ago with Dr Mejia, with noted kidney mass. Pt denies any fever/chills and no nausea/vomiting.

## 2022-05-13 NOTE — ED PROVIDER NOTE - NSFOLLOWUPINSTRUCTIONS_ED_ALL_ED_FT
Renal Mass       A renal mass is an abnormal growth in the kidney. It may be found while performing an MRI, CT scan, or ultrasound to evaluate other problems of the abdomen. A renal mass that is cancerous (malignant) may grow or spread quickly. Others are not cancerous (benign).    Renal masses include:•Tumors. These may be malignant or benign.  •The most common type of kidney cancer in adults is renal cell carcinoma. In children, the most common type of kidney cancer is Wilms tumor.      •The most common benign tumors of the kidney include renal adenomas, oncocytomas, and angiomyolipoma (AML).        •Cysts. These are fluid-filled sacs that form on or in the kidney.        What are the causes?    Certain types of cancers, infections, or injuries can cause a renal mass. It is not always known what causes a cyst to develop in or on the kidney.      What are the signs or symptoms?    Often, a renal mass does not cause any signs or symptoms; most kidney cysts do not cause symptoms.      How is this diagnosed?    Your health care provider may recommend tests to diagnose the cause of your renal mass. These tests may be done if a renal mass is found:  •Physical exam.      •Blood tests.      •Urine tests.      •Imaging tests, such as ultrasound, CT scan, or MRI.      •Biopsy. This is a small sample that is removed from the renal mass and tested in a lab.      The exact tests and how often they are done will depend on:  •The size and appearance of the renal mass.      •Risk factors or medical conditions that increase your risk for problems.      •Any symptoms associated with the renal mass, or concerns that you have about it.      Tests and physical exams may be done once, or they may be done regularly for a period of time. Tests and exams that are done regularly will help monitor whether the mass is growing and beginning to cause problems.      How is this treated?    Treatment is not always needed for this condition. Your health care provider may recommend careful monitoring and regular tests and exams. Treatment will depend on the cause of the mass.    Treatment for a cancerous renal mass may include surgical removal, chemotherapy, radiation, or immunotherapy.    Most kidney cysts do not need to be treated.      Follow these instructions at home:    What you need to do at home will depend on the cause of the mass. Follow the instructions that your health care provider gives to you. In general:  •Take over-the-counter and prescription medicines only as told by your health care provider.      •If you were prescribed an antibiotic medicine, take it as told by your health care provider. Do not stop taking the antibiotic even if you start to feel better.      •Follow any restrictions that are given to you by your health care provider.    •Keep all follow-up visits. This is important.  •You may need to see your health care provider once or twice a year to have CT scans and ultrasounds. These tests will show if your renal mass has changed or grown.          Contact a health care provider if you:    •Have pain in your side or back (flank pain).      •Have a fever.      •Feel full soon after eating.      •Have pain or swelling in the abdomen.      •Lose weight.        Get help right away if:    •Your pain gets worse.      •There is blood in your urine.      •You cannot urinate.      •You have chest pain.      •You have trouble breathing.      These symptoms may represent a serious problem that is an emergency. Do not wait to see if the symptoms will go away. Get medical help right away. Call your local emergency services (911 in the [place]       Summary    •A renal mass is an abnormal growth in the kidney. It may be cancerous (malignant) and grow or spread quickly, or it may not be cancerous (benign). Renal masses often do not have any signs or symptoms.      •Renal masses may be found while performing an MRI, CT scan, or ultrasound for other problems of the abdomen.      •Your health care provider may recommend that you have tests to diagnose the cause of your renal mass. These may include a physical exam, blood tests, urine tests, imaging, or a biopsy.      •Treatment is not always needed for this condition. Careful monitoring may be recommended.      This information is not intended to replace advice given to you by your health care provider. Make sure you discuss any questions you have with your health care provider.          Kidney Stones       Kidney stones are solid, rock-like deposits that form inside of the kidneys. The kidneys are a pair of organs that make urine. A kidney stone may form in a kidney and move into other parts of the urinary tract, including the tubes that connect the kidneys to the bladder (ureters), the bladder, and the tube that carries urine out of the body (urethra). As the stone moves through these areas, it can cause intense pain and block the flow of urine.    Kidney stones are created when high levels of certain minerals are found in the urine. The stones are usually passed out of the body through urination, but in some cases, medical treatment may be needed to remove them.      What are the causes?    Kidney stones may be caused by:  •A condition in which certain glands produce too much parathyroid hormone (primary hyperparathyroidism), which causes too much calcium buildup in the blood.      •A buildup of uric acid crystals in the bladder (hyperuricosuria). Uric acid is a chemical that the body produces when you eat certain foods. It usually exits the body in the urine.      •Narrowing (stricture) of one or both of the ureters.      •A kidney blockage that is present at birth (congenital obstruction).      •Past surgery on the kidney or the ureters, such as gastric bypass surgery.        What increases the risk?    The following factors may make you more likely to develop this condition:  •Having had a kidney stone in the past.      •Having a family history of kidney stones.      •Not drinking enough water.      •Eating a diet that is high in protein, salt (sodium), or sugar.      •Being overweight or obese.        What are the signs or symptoms?    Symptoms of a kidney stone may include:  •Pain in the side of the abdomen, right below the ribs (flank pain). Pain usually spreads (radiates) to the groin.      •Needing to urinate frequently or urgently.      •Painful urination.      •Blood in the urine (hematuria).      •Nausea.      •Vomiting.      •Fever and chills.        How is this diagnosed?    This condition may be diagnosed based on:  •Your symptoms and medical history.      •A physical exam.      •Blood tests.      •Urine tests. These may be done before and after the stone passes out of your body through urination.      •Imaging tests, such as a CT scan, abdominal X-ray, or ultrasound.      •A procedure to examine the inside of the bladder (cystoscopy).        How is this treated?    Treatment for kidney stones depends on the size, location, and makeup of the stones. Kidney stones will often pass out of the body through urination. You may need to:  •Increase your fluid intake to help pass the stone. In some cases, you may be given fluids through an IV and may need to be monitored at the hospital.      •Take medicine for pain.      •Make changes in your diet to help prevent kidney stones from coming back.      Sometimes, medical procedures are needed to remove a kidney stone. This may involve:•A procedure to break up kidney stones using:  •A focused beam of light (laser therapy).      •Shock waves (extracorporeal shock wave lithotripsy).        •Surgery to remove kidney stones. This may be needed if you have severe pain or have stones that block your urinary tract.        Follow these instructions at home:    Medicines     •Take over-the-counter and prescription medicines only as told by your health care provider.      •Ask your health care provider if the medicine prescribed to you requires you to avoid driving or using heavy machinery.      Eating and drinking     •Drink enough fluid to keep your urine pale yellow. You may be instructed to drink at least 8–10 glasses of water each day. This will help you pass the kidney stone.    •If directed, change your diet. This may include:  •Limiting how much sodium you eat.      •Eating more fruits and vegetables.      •Limiting how much animal protein—such as red meat, poultry, fish, and eggs—you eat.        •Follow instructions from your health care provider about eating or drinking restrictions.      General instructions   •Collect urine samples as told by your health care provider. You may need to collect a urine sample:  •24 hours after you pass the stone.      •8–12 weeks after passing the kidney stone, and every 6–12 months after that.        •Strain your urine every time you urinate, for as long as directed. Use the strainer that your health care provider recommends.      • Do not throw out the kidney stone after passing it. Keep the stone so it can be tested by your health care provider. Testing the makeup of your kidney stone may help prevent you from getting kidney stones in the future.      •Keep all follow-up visits as told by your health care provider. This is important. You may need follow-up X-rays or ultrasounds to make sure that your stone has passed.        How is this prevented?     To prevent another kidney stone:  •Drink enough fluid to keep your urine pale yellow. This is the best way to prevent kidney stones.      •Eat a healthy diet and follow recommendations from your health care provider about foods to avoid. You may be instructed to eat a low-protein diet. Recommendations vary depending on the type of kidney stone that you have.      •Maintain a healthy weight.        Where to find more information    •National Kidney Foundation (NKF): www.kidney.org      •[employer] ([facility]): www.urologyhealth.org        Contact a health care provider if:    •You have pain that gets worse or does not get better with medicine.        Get help right away if:    •You have a fever or chills.      •You develop severe pain.      •You develop new abdominal pain.      •You faint.      •You are unable to urinate.        Summary    •Kidney stones are solid, rock-like deposits that form inside of the kidneys.      •Kidney stones can cause nausea, vomiting, blood in the urine, abdominal pain, and the urge to urinate frequently.      •Treatment for kidney stones depends on the size, location, and makeup of the stones. Kidney stones will often pass out of the body through urination.      •Kidney stones can be prevented by drinking enough fluids, eating a healthy diet, and maintaining a healthy weight.      This information is not intended to replace advice given to you by your health care provider. Make sure you discuss any questions you have with your health care provider.

## 2022-05-13 NOTE — ED ADULT TRIAGE NOTE - CHIEF COMPLAINT QUOTE
patient reports procedure two weeks ago to remove kidney stone, was removed, stent placed, c/o R flank pain

## 2022-05-13 NOTE — ED ADULT NURSE NOTE - PAIN: PRESENCE, MLM
complains of pain/discomfort Bactrim Pregnancy And Lactation Text: This medication is Pregnancy Category D and is known to cause fetal risk.  It is also excreted in breast milk.

## 2022-05-14 LAB
CULTURE RESULTS: SIGNIFICANT CHANGE UP
SPECIMEN SOURCE: SIGNIFICANT CHANGE UP

## 2022-08-15 PROBLEM — D57.3 SICKLE-CELL TRAIT: Chronic | Status: ACTIVE | Noted: 2022-05-13

## 2022-08-15 PROBLEM — N20.0 CALCULUS OF KIDNEY: Chronic | Status: ACTIVE | Noted: 2022-05-13

## 2022-08-16 ENCOUNTER — OUTPATIENT (OUTPATIENT)
Dept: OUTPATIENT SERVICES | Facility: HOSPITAL | Age: 52
LOS: 1 days | End: 2022-08-16

## 2022-08-16 VITALS
SYSTOLIC BLOOD PRESSURE: 136 MMHG | HEART RATE: 82 BPM | HEIGHT: 67 IN | DIASTOLIC BLOOD PRESSURE: 88 MMHG | RESPIRATION RATE: 16 BRPM | TEMPERATURE: 98 F | OXYGEN SATURATION: 96 % | WEIGHT: 261.91 LBS

## 2022-08-16 DIAGNOSIS — N20.1 CALCULUS OF URETER: ICD-10-CM

## 2022-08-16 DIAGNOSIS — Z96.0 PRESENCE OF UROGENITAL IMPLANTS: Chronic | ICD-10-CM

## 2022-08-16 DIAGNOSIS — E66.01 MORBID (SEVERE) OBESITY DUE TO EXCESS CALORIES: ICD-10-CM

## 2022-08-16 DIAGNOSIS — I10 ESSENTIAL (PRIMARY) HYPERTENSION: ICD-10-CM

## 2022-08-16 LAB
ANION GAP SERPL CALC-SCNC: 11 MMOL/L — SIGNIFICANT CHANGE UP (ref 7–14)
APPEARANCE UR: CLEAR — SIGNIFICANT CHANGE UP
BACTERIA # UR AUTO: NEGATIVE — SIGNIFICANT CHANGE UP
BILIRUB UR-MCNC: NEGATIVE — SIGNIFICANT CHANGE UP
BUN SERPL-MCNC: 14 MG/DL — SIGNIFICANT CHANGE UP (ref 7–23)
CALCIUM SERPL-MCNC: 9.8 MG/DL — SIGNIFICANT CHANGE UP (ref 8.4–10.5)
CHLORIDE SERPL-SCNC: 101 MMOL/L — SIGNIFICANT CHANGE UP (ref 98–107)
CO2 SERPL-SCNC: 29 MMOL/L — SIGNIFICANT CHANGE UP (ref 22–31)
COLOR SPEC: YELLOW — SIGNIFICANT CHANGE UP
CREAT SERPL-MCNC: 1.11 MG/DL — SIGNIFICANT CHANGE UP (ref 0.5–1.3)
DIFF PNL FLD: ABNORMAL
EGFR: 79 ML/MIN/1.73M2 — SIGNIFICANT CHANGE UP
EPI CELLS # UR: 3 /HPF — SIGNIFICANT CHANGE UP (ref 0–5)
GLUCOSE SERPL-MCNC: 78 MG/DL — SIGNIFICANT CHANGE UP (ref 70–99)
GLUCOSE UR QL: NEGATIVE — SIGNIFICANT CHANGE UP
HCT VFR BLD CALC: 40.9 % — SIGNIFICANT CHANGE UP (ref 39–50)
HGB BLD-MCNC: 13.4 G/DL — SIGNIFICANT CHANGE UP (ref 13–17)
KETONES UR-MCNC: NEGATIVE — SIGNIFICANT CHANGE UP
LEUKOCYTE ESTERASE UR-ACNC: ABNORMAL
MCHC RBC-ENTMCNC: 25.3 PG — LOW (ref 27–34)
MCHC RBC-ENTMCNC: 32.8 GM/DL — SIGNIFICANT CHANGE UP (ref 32–36)
MCV RBC AUTO: 77.3 FL — LOW (ref 80–100)
NITRITE UR-MCNC: NEGATIVE — SIGNIFICANT CHANGE UP
NRBC # BLD: 0 /100 WBCS — SIGNIFICANT CHANGE UP (ref 0–0)
NRBC # FLD: 0 K/UL — SIGNIFICANT CHANGE UP (ref 0–0)
PH UR: 6.5 — SIGNIFICANT CHANGE UP (ref 5–8)
PLATELET # BLD AUTO: 238 K/UL — SIGNIFICANT CHANGE UP (ref 150–400)
POTASSIUM SERPL-MCNC: 3.5 MMOL/L — SIGNIFICANT CHANGE UP (ref 3.5–5.3)
POTASSIUM SERPL-SCNC: 3.5 MMOL/L — SIGNIFICANT CHANGE UP (ref 3.5–5.3)
PROT UR-MCNC: ABNORMAL
RBC # BLD: 5.29 M/UL — SIGNIFICANT CHANGE UP (ref 4.2–5.8)
RBC # FLD: 14.1 % — SIGNIFICANT CHANGE UP (ref 10.3–14.5)
RBC CASTS # UR COMP ASSIST: 82 /HPF — HIGH (ref 0–4)
SODIUM SERPL-SCNC: 141 MMOL/L — SIGNIFICANT CHANGE UP (ref 135–145)
SP GR SPEC: 1.02 — SIGNIFICANT CHANGE UP (ref 1.01–1.05)
UROBILINOGEN FLD QL: SIGNIFICANT CHANGE UP
WBC # BLD: 8.04 K/UL — SIGNIFICANT CHANGE UP (ref 3.8–10.5)
WBC # FLD AUTO: 8.04 K/UL — SIGNIFICANT CHANGE UP (ref 3.8–10.5)
WBC UR QL: 41 /HPF — HIGH (ref 0–5)

## 2022-08-16 PROCEDURE — 93010 ELECTROCARDIOGRAM REPORT: CPT

## 2022-08-16 NOTE — H&P PST ADULT - HISTORY OF PRESENT ILLNESS
year old male with PMH of HTN, HLD,  presents to Presurgical testing with diagnosis of  year old male with PMH of HTN, HLD, Sickle Cell Trait, presents to Presurgical testing with diagnosis of  51 year old male with PMH of HTN, HLD, Sickle Cell Trait, presents to Presurgical testing with diagnosis of right kidney stones.  Pt had ureteral stent inserted; now scheduled for right ureteroscopy, laser lithotripsy, right ureteral stent removal & replacement.

## 2022-08-16 NOTE — H&P PST ADULT - NSICDXPASTMEDICALHX_GEN_ALL_CORE_FT
PAST MEDICAL HISTORY:  Hypertension     Right nephrolithiasis     Sickle cell trait      PAST MEDICAL HISTORY:  Hay fever     Hypertension     Right nephrolithiasis     Sickle cell trait

## 2022-08-16 NOTE — H&P PST ADULT - NS MD HP INPLANTS MED DEV
Date: 1/14/2021    Patient Name: Rita Wasserman          To Whom it may concern: This letter has been written at the patient's request. The above patient was seen at one of the [facility] locations for treatment of a medical condition. None

## 2022-08-16 NOTE — H&P PST ADULT - NSANTHOSAYNRD_GEN_A_CORE
No. ROXANN screening performed.  STOP BANG Legend: 0-2 = LOW Risk; 3-4 = INTERMEDIATE Risk; 5-8 = HIGH Risk

## 2022-08-16 NOTE — H&P PST ADULT - NEGATIVE ENMT SYMPTOMS
no hearing difficulty/no ear pain/no vertigo/no sinus symptoms no hearing difficulty/no ear pain/no vertigo/no sinus symptoms/no nasal congestion/no post-nasal discharge

## 2022-08-16 NOTE — H&P PST ADULT - PROBLEM SELECTOR PLAN 1
Now scheduled for right ureteroscopy, laser lithotripsy, right ureteral stent removal & replacement.  Pre-op instructions provided. Pt given verbal and written instructions with teach back on chlorhexidine shampoo and Pepcid. Pt verbalized understanding with return demonstration.   Pending labs and medical clearance with cardiac evaluation. Pt had ekg done, comparison on chart, but never had  stress test done   Covid testing scheduled prior to surgery

## 2022-08-16 NOTE — H&P PST ADULT - NEGATIVE GENERAL GENITOURINARY SYMPTOMS
no hematuria/no incontinence/no dysuria/no urinary hesitancy no hematuria/no renal colic/no flank pain L/no flank pain R/no urine discoloration/no bladder infections/no incontinence/no dysuria/no urinary hesitancy

## 2022-08-17 LAB
CULTURE RESULTS: SIGNIFICANT CHANGE UP
SPECIMEN SOURCE: SIGNIFICANT CHANGE UP

## 2022-08-30 VITALS
TEMPERATURE: 98 F | OXYGEN SATURATION: 98 % | RESPIRATION RATE: 20 BRPM | HEART RATE: 76 BPM | DIASTOLIC BLOOD PRESSURE: 84 MMHG | HEIGHT: 67 IN | SYSTOLIC BLOOD PRESSURE: 127 MMHG | WEIGHT: 261.91 LBS

## 2022-08-30 NOTE — ASU PREOPERATIVE ASSESSMENT, ADULT ([facility] ONLY) - FALL HARM RISK - UNIVERSAL INTERVENTIONS
Bed in lowest position, wheels locked, appropriate side rails in place/Call bell, personal items and telephone in reach/Instruct patient to call for assistance before getting out of bed or chair/Non-slip footwear when patient is out of bed/[place] to call system/Physically safe environment - no spills, clutter or unnecessary equipment/Purposeful Proactive Rounding/Room/bathroom lighting operational, light cord in reach

## 2022-08-31 ENCOUNTER — OUTPATIENT (OUTPATIENT)
Dept: OUTPATIENT SERVICES | Facility: HOSPITAL | Age: 52
LOS: 1 days | Discharge: HOME | End: 2022-08-31

## 2022-08-31 VITALS
TEMPERATURE: 97 F | DIASTOLIC BLOOD PRESSURE: 74 MMHG | SYSTOLIC BLOOD PRESSURE: 120 MMHG | RESPIRATION RATE: 20 BRPM | OXYGEN SATURATION: 100 % | HEART RATE: 75 BPM

## 2022-08-31 DIAGNOSIS — N20.1 CALCULUS OF URETER: ICD-10-CM

## 2022-08-31 DIAGNOSIS — Z96.0 PRESENCE OF UROGENITAL IMPLANTS: Chronic | ICD-10-CM

## 2022-08-31 DEVICE — GUIDEWIRE SENSOR DUAL-FLEX NITINOL STRAIGHT .038" X 150CM: Type: IMPLANTABLE DEVICE | Site: RIGHT | Status: FUNCTIONAL

## 2022-08-31 DEVICE — LASER FIBER SOLTIVE 365: Type: IMPLANTABLE DEVICE | Site: RIGHT | Status: FUNCTIONAL

## 2022-08-31 DEVICE — IMPLANTABLE DEVICE: Type: IMPLANTABLE DEVICE | Site: RIGHT | Status: FUNCTIONAL

## 2022-08-31 DEVICE — URETERAL SHEATH NAVIGATOR HD 12/14FR X 36CM: Type: IMPLANTABLE DEVICE | Site: RIGHT | Status: FUNCTIONAL

## 2022-08-31 DEVICE — STONE BASKET ZEROTIP NITINOL 4-WIRE 1.9FR 120CM X 12MM: Type: IMPLANTABLE DEVICE | Site: RIGHT | Status: FUNCTIONAL

## 2022-08-31 DEVICE — URETEROSCOPE LITHOVUE DISP: Type: IMPLANTABLE DEVICE | Site: RIGHT | Status: FUNCTIONAL

## 2022-08-31 DEVICE — URETERAL STENT PERCUFLEX PLUS 6FR 26CM: Type: IMPLANTABLE DEVICE | Site: RIGHT | Status: FUNCTIONAL

## 2022-08-31 RX ORDER — TAMSULOSIN HYDROCHLORIDE 0.4 MG/1
1 CAPSULE ORAL
Qty: 10 | Refills: 0
Start: 2022-08-31 | End: 2022-09-09

## 2022-08-31 RX ORDER — LORATADINE 10 MG/1
1 TABLET ORAL
Qty: 0 | Refills: 0 | DISCHARGE

## 2022-08-31 RX ORDER — OXYCODONE AND ACETAMINOPHEN 5; 325 MG/1; MG/1
1 TABLET ORAL
Qty: 12 | Refills: 0
Start: 2022-08-31 | End: 2022-09-02

## 2022-08-31 RX ORDER — ATORVASTATIN CALCIUM 80 MG/1
1 TABLET, FILM COATED ORAL
Qty: 0 | Refills: 0 | DISCHARGE

## 2022-08-31 RX ORDER — ACETAMINOPHEN 500 MG
2 TABLET ORAL
Qty: 0 | Refills: 0 | DISCHARGE

## 2022-08-31 RX ORDER — FAMOTIDINE 10 MG/ML
0 INJECTION INTRAVENOUS
Qty: 0 | Refills: 0 | DISCHARGE

## 2022-08-31 RX ORDER — IBUPROFEN 200 MG
1 TABLET ORAL
Qty: 0 | Refills: 0 | DISCHARGE

## 2022-08-31 NOTE — ASU DISCHARGE PLAN (ADULT/PEDIATRIC) - NS MD DC FALL RISK RISK
For information on Fall & Injury Prevention, visit: [email or web address] OR  [email or web address] OR  https://www.cdc.gov/steadi/patient.html

## 2022-08-31 NOTE — ASU DISCHARGE PLAN (ADULT/PEDIATRIC) - CALL YOUR DOCTOR IF YOU HAVE ANY OF THE FOLLOWING:
Bleeding that does not stop/Fever greater than (need to indicate Fahrenheit or Celsius)/Wound/Surgical Site with redness, or foul smelling discharge or pus/Nausea and vomiting that does not stop/Unable to urinate/Excessive diarrhea/Inability to tolerate liquids or foods/Increased irritability or sluggishness

## 2022-08-31 NOTE — ASU DISCHARGE PLAN (ADULT/PEDIATRIC) - ASU DC SPECIAL INSTRUCTIONSFT
CATHETER: Some patients are sent home with a matt catheter while others go home urinating on their own. If you still have a catheter, the nurses will review instructions and care before you go home.. With a catheter in place, it is not uncommon to have occasional leakage of urine or blood around the catheter. Please call your urologist if this is excessive and/or the urine is not draining through the catheter into the bag. Plan to see Dr Mejia tomorrow in clinic for catheter removal.   STENT: You may have an internal stent (a hollow tube that runs from the kidney to your bladder) after your procedure, which helps urine drain from the kidney to your bladder. Some patients experience urinary frequency, burning, or even back pain (especially with urination). These sensations will gradually get better. Increasing your fluid intake can also improve these symptoms. While the stent is in place, your urine may continue to be bloody. This stent is temporary and must be removed by your urologist as an outpatient with in 3 months.   GENERAL: It is common to have blood in your urine after your procedure. It may be pink or even red; inform your doctor if you have a significant amount of clot in the urine or if you are unable to void at all. The urine may clear and then become bloody again especially as you are more physically active.  BATHING: You may shower or bathe.  DIET: You may resume your regular diet and regular medication regimen.  PAIN: You may take Tylenol (acetaminophen) and/or Motrin (ibuprofen) both available over the counter, for pain every 6 hours as needed. Do not exceed 4000mg of Tylenol (acetaminophen) daily. You may alternate these medications such that you take one or the other every 3 hours for around the clock pain coverage. You have also been given a stronger narcotic pain medication (Percocet) for pain that is not amenable to over the counter pills. Please note that Percocet has Tylenol in it so do not use together with Tylenol.  If you have a stent, the following medications may have been sent to your pharmacy for stent related discomfort: Flomax (tamsulosin) 0.4mg at bedtime until stent removed.   STOOL SOFTENERS: Do not allow yourself to become constipated as straining may cause bleeding. Take stool softeners or a laxative (ex. Miralax, Colace, Senokot, ExLax, etc), available over the counter, if needed.  ACTIVITY: No heavy lifting or strenuous exercise until you are evaluated at your post-operative appointment. Otherwise, you may return to your usual level of physical activity.  ANTICOAGULATION: If you are taking any blood thinning medications, please discuss with your urologist prior to restarting these medications unless otherwise specified.  FOLLOW-UP: Plan to see Dr Mejia tomorrow in clinic for catheter removal.   CALL YOUR UROLOGIST IF: You have any bleeding that does not stop, inability to void >8 hours, fever over 100.4 F, chills, persistent nausea/vomiting, changes in your incision concerning for infection, or if your pain is not controlled on your discharge pain medications.

## 2022-08-31 NOTE — ASU DISCHARGE PLAN (ADULT/PEDIATRIC) - CARE PROVIDER_API CALL
Tyron Mejia)  Urology  [address] [place] [address]  Phone: [phone]  Fax: [phone]  Follow Up Time:

## 2022-08-31 NOTE — BRIEF OPERATIVE NOTE - OPERATION/FINDINGS
Procedure: cystoscopy, right ureteroscopy with stent placement  Preop dx: right ureteral stone  Postop dx: right ureteral stone

## 2022-09-06 LAB — NIDUS STONE QN: SIGNIFICANT CHANGE UP

## 2024-07-17 NOTE — H&P PST ADULT - PULMONARY EMBOLUS
Lvm informing patient I updated her work note and sent it to her mychart and will have a copy ready for  at . I gave new address in case she needs it   no

## 2025-05-05 NOTE — ED ADULT TRIAGE NOTE - ESI TRIAGE ACUITY LEVEL, MLM
[FreeTextEntry1] : Targets in patients 65 years and older: HbA1c 7 to 8%, BP < 140/90, LDL < 55  HbA1c was below goal but I will accept this.  LDL is above goal but she was started on rosuvastatin after this - I am rechecking her LDL level.   Patient is on Aspirin, statin and ARB.    Last lipid panel - Jan 2025 - Trig 105,  Last HbA1c - 05/05/2025  - 6.5% Last Vitamin B12 - N/A Last urine albumin panel - May 2024 - neg, May 2021 - positive Last BMP/CMP - Dec 2024 - Cr N, K N, AST N, ALT N  Plan: 1. Continue Farxiga 10 mg po daily 2. Fingersticks to be done once daily 3. Labs to be done in 3 months - see below 4. Follow up in 3 months to review labs and meter.
3

## (undated) DEVICE — SYR LUER LOK 10CC

## (undated) DEVICE — PRESSURE INFUSOR BAG 3000ML

## (undated) DEVICE — FOLEY CATH 2-WAY 18FR 30CC LATEX HYDROGEL

## (undated) DEVICE — DRAPE BACK TABLE COVER 44X90"

## (undated) DEVICE — WARMING BLANKET UPPER ADULT

## (undated) DEVICE — ADAPTER CHECK FLO 9FR STERILE

## (undated) DEVICE — SOL IRR POUR NS 0.9% 500ML

## (undated) DEVICE — POSITIONER STRAP ARMBOARD VELCRO TS-30

## (undated) DEVICE — Device

## (undated) DEVICE — DRAINAGE BAG URINARY 2L

## (undated) DEVICE — DRAPE C ARM UNIVERSAL

## (undated) DEVICE — VENODYNE/SCD SLEEVE CALF MEDIUM

## (undated) DEVICE — LABELS BLANK W PEN

## (undated) DEVICE — SOL IRR BAG NS 0.9% 3000ML

## (undated) DEVICE — SYR LUER LOK 20CC

## (undated) DEVICE — FOLEY HOLDER STATLOCK 2 WAY ADULT

## (undated) DEVICE — TUBING TUR 2 PRONG

## (undated) DEVICE — PREP BETADINE SPONGE STICKS

## (undated) DEVICE — TUBING SUCTION NONCONDUCTIVE 6MM X 12FT

## (undated) DEVICE — BASIN SET DOUBLE

## (undated) DEVICE — GOWN XL W TOWEL